# Patient Record
Sex: FEMALE | Race: WHITE | Employment: OTHER | URBAN - METROPOLITAN AREA
[De-identification: names, ages, dates, MRNs, and addresses within clinical notes are randomized per-mention and may not be internally consistent; named-entity substitution may affect disease eponyms.]

---

## 2017-01-11 ENCOUNTER — OFFICE VISIT (OUTPATIENT)
Dept: FAMILY MEDICINE | Facility: CLINIC | Age: 82
End: 2017-01-11
Payer: COMMERCIAL

## 2017-01-11 VITALS
HEART RATE: 71 BPM | SYSTOLIC BLOOD PRESSURE: 124 MMHG | OXYGEN SATURATION: 96 % | DIASTOLIC BLOOD PRESSURE: 60 MMHG | RESPIRATION RATE: 12 BRPM

## 2017-01-11 DIAGNOSIS — I35.0 SEVERE AORTIC STENOSIS: ICD-10-CM

## 2017-01-11 DIAGNOSIS — Z71.89 ADVANCED DIRECTIVES, COUNSELING/DISCUSSION: Chronic | ICD-10-CM

## 2017-01-11 DIAGNOSIS — M25.562 CHRONIC PAIN OF LEFT KNEE: ICD-10-CM

## 2017-01-11 DIAGNOSIS — G89.29 OTHER CHRONIC PAIN: ICD-10-CM

## 2017-01-11 DIAGNOSIS — G89.29 CHRONIC PAIN OF LEFT KNEE: ICD-10-CM

## 2017-01-11 DIAGNOSIS — M79.605 PAIN OF LEFT LOWER EXTREMITY: ICD-10-CM

## 2017-01-11 DIAGNOSIS — F02.818 DEMENTIA DUE TO MEDICAL CONDITION WITH BEHAVIORAL DISTURBANCE (H): Primary | ICD-10-CM

## 2017-01-11 PROCEDURE — 99350 HOME/RES VST EST HIGH MDM 60: CPT | Performed by: NURSE PRACTITIONER

## 2017-01-11 RX ORDER — TRAMADOL HYDROCHLORIDE 50 MG/1
50 TABLET ORAL EVERY 6 HOURS PRN
Qty: 10 TABLET | Refills: 0 | Status: SHIPPED | OUTPATIENT
Start: 2017-01-11

## 2017-01-11 NOTE — PROGRESS NOTES
SUBJECTIVE:                                                    Felicia Soriano is a 89 year old female who with a complex medical history significant for CKD stage 3, advanced dementia, Congestive heart failure, severe aortic stenosis, chronic knee pain is being followed by the complex care program and is seen in the home today for the following health issues:     All communication held between PCP and ALYSSA Carlson.     Concern - dementia     Onset: 2 years ago moderate to severe    Description:   Story telling, memory issues, wondering outside in the winter, some periods of incontinence urinary/bowel.     Intensity: moderate    Progression of Symptoms:  Stable.     Accompanying Signs & Symptoms:  Per group home RN patient's been having more behavioral issues,refusing care at times and physically aggressive. Recently increased seroquel 25 mg HS and improved agitation.     Previous history of similar problem: yes    Precipitating factors:   Worsened by: nothing    Alleviating factors:  Improved by: Seroquel and routine       Therapies Tried and outcome:seroquel       Joint Pain Left knee pain f/u     Onset: years     Description: She had a fall last March 2016 and since this time her left knee hurts.   Location: left knee  Character: Dull ache    Intensity: moderate    Progression of Symptoms: same    Accompanying Signs & Symptoms:  Other symptoms: none   History:   Previous similar pain: YES      Precipitating factors:   Trauma or overuse: YES- as stated she had a fall.     Alleviating factors:  Improved by: ice       Therapies Tried and outcome: had joint injection last week and effective.     Chronic Kidney Disease Follow-up      Current NSAID use?  No    Aortic Stenosis  History limited d/t patient's advanced dementia and sleeping today. Per PCA patient has no complaints. No syncopal episodes or dizziness since last hospital admission in 11/2016. Patient is very fatigued, low energy, sleeping almost 18 hrs/day. Gets  out of bed only to sit in recliner all day.     Problem list and histories reviewed & adjusted, as indicated.  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     Malignant neoplasm of breast (female) (H)     Vitamin D deficiency     Essential hypertension     Renal sclerosis     Edema     Advance Care Planning     CKD (chronic kidney disease) stage 3, GFR 30-59 ml/min     Heart failure with preserved ejection fraction (H)     Depression     Weakness     Excessive sleepiness     Flat affect     Infectious encephalopathy     Benign hypertensive heart and CKD, 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    Social History   Substance Use Topics     Smoking status: Never Smoker      Smokeless tobacco: Never Used     Alcohol Use: No     Family History   Problem Relation Age of Onset     Parkinsonism Father      Alzheimer Disease Mother          Current Outpatient Prescriptions   Medication Sig Dispense Refill     QUEtiapine (SEROQUEL) 25 MG tablet Take 1 tablet (25 mg) by mouth At Bedtime New increase 60 tablet 0     Vitamin D, Cholecalciferol, 1000 UNITS TABS Take 2,000 Units by mouth daily 180 tablet 3     aspirin 81 MG EC tablet Take 1 tablet (81 mg) by mouth daily 30 tablet 11     acetaminophen (TYLENOL) 325 MG tablet Take 2 tablets (650 mg) by mouth every 6 hours as needed for mild pain 100 tablet 3     traMADol (ULTRAM) 50 MG tablet Take 1 tablet (50 mg) by mouth every 6 hours as needed for moderate pain 10 tablet 0     docusate sodium (COLACE) 100 MG capsule Take 1 capsule (100 mg) by mouth daily 60 capsule 3     furosemide (LASIX) 20 MG  tablet Take 1 tablet (20 mg) by mouth daily 90 tablet 1     potassium chloride (K-TAB,KLOR-CON) 10 MEQ tablet Take 1 tablet (10 mEq) by mouth daily 90 tablet 1     order for DME Equipment being ordered: chair lift   For severe arthritis of left knee 1 Device 0     order for DME Equipment being ordered: chair lift 1 Device 0     phenazopyridine (PYRIDIUM) 100 MG tablet Take 1 tablet (100 mg) by mouth 3 times daily as needed for urinary tract discomfort 12 tablet 0     order for DME Equipment being ordered: Fully Electronic Hospital Bed 1 Device 0     melatonin 1 MG TABS Take 2 mg by mouth nightly as needed for sleep       senna-docusate (SENOKOT-S;PERICOLACE) 8.6-50 MG per tablet Take 1 tablet by mouth 2 times daily as needed for constipation       polyethylene glycol (MIRALAX/GLYCOLAX) powder Take 17 g by mouth daily as needed for constipation       order for DME Equipment being ordered: blood pressure kit , cuff size large 1 Device 0     albuterol (PROAIR HFA, PROVENTIL HFA, VENTOLIN HFA) 108 (90 BASE) MCG/ACT inhaler Inhale 2 puffs into the lungs every 6 hours as needed for shortness of breath / dyspnea or wheezing 1 Inhaler 0     Allergies   Allergen Reactions     Tape [Adhesive Tape] Rash     Paper tape     Recent Labs   Lab Test  12/30/16   1414  11/13/16   1900  03/24/16 03/13/16   1545   09/28/15   0904   A1C   --    --    --   5.4   --    --    --    --    ALT  13  14   --    --    --   18   --    --    CR  1.04  0.96   < >  0.84   < >  0.93   < >  0.94   GFRESTIMATED  50*  54*   < >  >60   < >  57*   < >  56*   GFRESTBLACK  60*  66   < >  >60   < >  69   < >  68   POTASSIUM  4.2  3.7   < >  4.1   < >  3.9   < >  3.7   TSH   --    --    --   4.05   --    --    --   2.09    < > = values in this interval not displayed.      BP Readings from Last 3 Encounters:   12/27/16 105/85   11/18/16 135/80   11/14/16 129/53    Wt Readings from Last 3 Encounters:   03/30/16 223 lb 6.4 oz (101.334 kg)   03/28/16 218 lb  9.6 oz (99.156 kg)   03/23/16 223 lb (101.152 kg)            Labs reviewed in EPIC  Problem list, Medication list, Allergies, and Medical/Social/Surgical histories reviewed in [facility] and updated as appropriate.    ROS:  Constitutional, HEENT, cardiovascular, pulmonary, GI, , musculoskeletal, neuro, skin, endocrine and psych systems are negative, except as otherwise noted.    OBJECTIVE:                                                    /60 mmHg  Pulse 71  Resp 12  SpO2 96%  There is no weight on file to calculate BMI.  GENERAL: sleeping throughout encounter in recliner chair   EYES: Eyes grossly normal to inspection, PERRL and conjunctivae and sclerae normal  RESP: lungs clear to auscultation - no rales, rhonchi or wheezes.   CV: Normal rate and rhythm. Systolic murmur 2/6 heard best over right sternal border.   ABDOMEN: soft, nontender, no hepatosplenomegaly, no masses and bowel sounds normal  MS: no gross musculoskeletal defects noted, no edema  SKIN: dry and warm  NEURO: sleeping during encounter. Not assessed.     Diagnostic Test Results:  none      ASSESSMENT/PLAN:                                                      1. 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  Sleeping throughout day 18hrs daily. Appetite has decreased, weight is down 10% from 1 year ago 233 lbs down to 199 lbs. Seroquel has been effective in managing episodic outburst of agitation and irritability. For the above reasons I believe this patient would benefit from hospice services. Referral completed.     2. Severe aortic stenosis  Fatigued and low energy. Stays in recliner most of the day.      3. Advance Care Planning  Dicussed hospice referral with  RN Robyn and patient's daughter aware and in agreement.     4. Chronic pain of left knee  Good relief with joint injection last week. Has Tramadol PRN ordered but was not sent from pharmacy per .          AHSAN [facility]  60 min spent in direct face to face time with this patient and group home RN and RNreater than 50% in counseling  \AS, dementia as stated above and coordination of care as stated above .

## 2017-01-11 NOTE — MR AVS SNAPSHOT
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  Home Care or Hospice will then contact the patient to schedule their appointment.**    If you do not hear from Home Care and Hospice, or you would like to call to schedule, please call the referring place of service that your provider has listed below.  ______________________________________________________________________    Your provider has referred you to: [facility]: [facility] [phone]   [email or web address]    Extended Emergency Contact Information  Primary Emergency Contact: CIRILO MONROE  Address: [address] [place] [address] [facility]  Home Phone: [phone]  Work Phone: none  Mobile Phone: [phone]  Relation: Daughter    Patient Anticipated Discharge Date: 01/11/2017     RN, PT, HHA to begin 24 - 48 hours after discharge.  PLEASE EVALUATE AND TREAT (Evaluation timeline is 24 - 48 hrs. Please call if there is need for a variance to this timeline).    REASON FOR REFERRAL: Hospice - Diagnosis: Advanced dementia and severe aortic stenosis    ADDITIONAL SERVICES NEEDED: none    OTHER PERTINENT INFORMATION: Patient was last seen  by provider on 01/11/2017   for hospice referral, knee pain, dementia with behavioral disturbances.    Current Outpatient Prescriptions:  QUEtiapine (SEROQUEL) 25 MG tablet, Take 1 tablet (25 mg) by mouth At Bedtime New increase, Disp: 60 tablet, Rfl: 0  Vitamin D, Cholecalciferol, 1000 UNITS TABS, Take 2,000 Units by mouth daily, Disp: 180 tablet, Rfl: 3  aspirin 81 MG EC tablet, Take 1 tablet (81 mg) by mouth daily, Disp: 30 tablet, Rfl: 11  acetaminophen (TYLENOL) 325 MG tablet, Take 2 tablets (650 mg) by mouth every 6 hours as needed for mild pain, Disp: 100 tablet, Rfl: 3  traMADol (ULTRAM) 50 MG tablet, Take 1 tablet (50 mg) by mouth every 6 hours as needed for moderate pain, Disp: 10 tablet, Rfl: 0  docusate sodium (COLACE) 100 MG capsule, Take 1 capsule (100 mg) by mouth daily, Disp: 60 capsule, Rfl: 3  furosemide (LASIX) 20 MG tablet, Take 1 tablet (20 mg) by mouth daily, Disp: 90 tablet, Rfl: 1  potassium chloride (K-TAB,KLOR-CON) 10 MEQ tablet, Take 1 tablet (10 mEq) by mouth daily, Disp: 90 tablet, Rfl: 1  order for DME, Equipment being ordered: chair lift   For severe arthritis of left knee, Disp: 1 Device, Rfl: 0  order for DME, Equipment being ordered: chair lift, Disp: 1 Device, Rfl: 0  phenazopyridine (PYRIDIUM) 100 MG tablet, Take 1 tablet (100 mg) by mouth 3 times daily as needed for urinary tract discomfort, Disp: 12 tablet, Rfl: 0  order for DME, Equipment being ordered: Fully Electronic Hospital Bed, Disp: 1 Device, Rfl: 0  melatonin 1 MG TABS, Take 2 mg by mouth nightly as needed for sleep, Disp: , Rfl:   senna-docusate (SENOKOT-S;PERICOLACE) 8.6-50 MG per tablet, Take 1 tablet by mouth 2 times daily as needed for constipation, Disp: , Rfl:   polyethylene glycol (MIRALAX/GLYCOLAX) powder, Take 17 g by mouth daily as needed for constipation, Disp: , Rfl:   order for DME, Equipment being ordered: blood pressure kit , cuff size large, Disp: 1 Device, Rfl: 0  albuterol (PROAIR HFA, PROVENTIL HFA,  VENTOLIN HFA) 108 (90 BASE) MCG/ACT inhaler, Inhale 2 puffs into the lungs every 6 hours as needed for shortness of breath / dyspnea or wheezing, Disp: 1 Inhaler, Rfl: 0      Patient Active Problem List:     Falls frequently     Aortic valve disorder     Esophageal reflux     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     Malignant neoplasm of breast (female) (H)     Vitamin D deficiency     Essential hypertension     Renal sclerosis     Edema     Advance Care Planning     CKD (chronic kidney disease) stage 3, GFR 30-59 ml/min     Heart failure with preserved ejection fraction (H)     Depression     Weakness     Excessive sleepiness     Flat affect     Infectious encephalopathy     Benign hypertensive heart and CKD, stage 3 (GFR 30-59), w CHF (H)     Physical deconditioning     Bilateral lower extremity pain     Agitation     Chronic pain of left knee     Health Care Home     Arthritis of knee     Syncope     Severe aortic stenosis      Documentation of Face to Face and Certification for Home Health Services    I certify that patientFelicia is under my care and that I, or a Nurse Practitioner or Physician's Assistant working with me, had a face-to-face encounter that meets the physician face-to-face encounter requirements with this patient on: 1/11/2017.    This encounter with the patient was in whole, or in part, for the following medical condition, which is the primary reason for Home Health Care: advanced dementia with behavioral disturbances, aortic stenosis.    I certify that, based on my findings, the following services are medically necessary Home Health Services: Nursing    My clinical findings support the need for the above services because: Nurse is needed: To assess pain and behavior after changes in medications or other medical regimen..    Further, I certify that my clinical findings support that this patient is homebound (i.e. absences from home require considerable and  taxing effort and are for medical reasons or [employer] services or infrequently or of short duration when for other reasons) because: Requires assistance of another person or specialized equipment to access medical services because patient: Is prone to wander/get lost without assistance..    Based on the above findings, I certify that this patient is confined to the home and needs intermittent skilled nursing care, physical therapy and/or speech therapy.  The patient is under my care, and I have initiated the establishment of the plan of care.  This patient will be followed by a physician who will periodically review the plan of care.    Physician/Provider to provide follow up care: Shahrzad Roach    [facility] certified Physician at time of discharge: Shahrzad Roach      Please be aware that coverage of these services is subject to the terms and limitations of your health insurance plan.  Call member services at your health plan with any benefit or coverage questions.                  Who to contact     If you have questions or need follow up information about today's clinic visit or your schedule please contact [facility] directly at [phone].  Normal or non-critical lab and imaging results will be communicated to you by PillGuardhart, letter or phone within 4 business days after the clinic has received the results. If you do not hear from us within 7 days, please contact the clinic through PillGuardhart or phone. If you have a critical or abnormal lab result, we will notify you by phone as soon as possible.  Submit refill requests through [employer] or call your pharmacy and they will forward the refill request to us. Please allow 3 business days for your refill to be completed.          Additional Information About Your Visit        PillGuardhart Information     W[employer] gives you secure access to your electronic health record. If you see a primary care provider, you can also send messages  to your care team and make appointments. If you have questions, please call your primary care clinic.  If you do not have a primary care provider, please call [phone] and they will assist you.        Care EveryWhere ID     This is your Care EveryWhere ID. This could be used by other organizations to access your [place] medical records  [id number]        Your Vitals Were     Pulse Respirations Pulse Oximetry             71 12 96%          Blood Pressure from Last 3 Encounters:   01/11/17 124/60   12/27/16 105/85   11/18/16 135/80    Weight from Last 3 Encounters:   03/30/16 223 lb 6.4 oz (101.334 kg)   03/28/16 218 lb 9.6 oz (99.156 kg)   03/23/16 223 lb (101.152 kg)              We Performed the Following     HOSPICE REFERRAL          Where to get your medicines      Some of these will need a paper prescription and others can be bought over the counter.  Ask your nurse if you have questions.     Bring a paper prescription for each of these medications    - traMADol 50 MG tablet       Primary Care Provider Office Phone # Fax #    AHSAN Gerard [phone]       [facility] [address] [place] [id number]        Thank you!     Thank you for choosing [facility]  for your care. Our goal is always to provide you with excellent care. Hearing back from our patients is one way we can continue to improve our services. Please take a few minutes to complete the written survey that you may receive in the mail after your visit with us. Thank you!             Your Updated Medication List - Protect others around you: Learn how to safely use, store and throw away your medicines at www.disposemymeds.org.          This list is accurate as of: 1/11/17 11:59 PM.  Always use your most recent med list.                   Brand Name Dispense Instructions for use    acetaminophen 325 MG tablet    TYLENOL    100 tablet    Take 2 tablets (650 mg) by mouth  every 6 hours as needed for mild pain       albuterol 108 (90 BASE) MCG/ACT Inhaler    PROAIR HFA/PROVENTIL HFA/VENTOLIN HFA    1 Inhaler    Inhale 2 puffs into the lungs every 6 hours as needed for shortness of breath / dyspnea or wheezing       aspirin 81 MG EC tablet     30 tablet    Take 1 tablet (81 mg) by mouth daily       docusate sodium 100 MG capsule    COLACE    60 capsule    Take 1 capsule (100 mg) by mouth daily       furosemide 20 MG tablet    LASIX    90 tablet    Take 1 tablet (20 mg) by mouth daily       melatonin 1 MG Tabs tablet      Take 2 mg by mouth nightly as needed for sleep       * order for DME     1 Device    Equipment being ordered: blood pressure kit , cuff size large       * order for DME     1 Device    Equipment being ordered: Fully Electronic Hospital Bed       * order for DME     1 Device    Equipment being ordered: chair lift       * order for DME     1 Device    Equipment being ordered: chair lift  For severe arthritis of left knee       phenazopyridine 100 MG tablet    PYRIDIUM    12 tablet    Take 1 tablet (100 mg) by mouth 3 times daily as needed for urinary tract discomfort       polyethylene glycol powder    MIRALAX/GLYCOLAX     Take 17 g by mouth daily as needed for constipation       potassium chloride 10 MEQ tablet    K-TAB,KLOR-CON    90 tablet    Take 1 tablet (10 mEq) by mouth daily       QUEtiapine 25 MG tablet    SEROQUEL    60 tablet    Take 1 tablet (25 mg) by mouth At Bedtime New increase       senna-docusate 8.6-50 MG per tablet    SENOKOT-S;PERICOLACE     Take 1 tablet by mouth 2 times daily as needed for constipation       traMADol 50 MG tablet    ULTRAM    10 tablet    Take 1 tablet (50 mg) by mouth every 6 hours as needed for moderate pain       Vitamin D (Cholecalciferol) 1000 UNITS Tabs     180 tablet    Take 2,000 Units by mouth daily       * Notice:  This list has 4 medication(s) that are the same as other medications prescribed for you. Read the directions  carefully, and ask your doctor or other care provider to review them with you.

## 2017-01-13 ENCOUNTER — TELEPHONE (OUTPATIENT)
Dept: FAMILY MEDICINE | Facility: CLINIC | Age: 82
End: 2017-01-13

## 2017-01-13 ENCOUNTER — MEDICAL CORRESPONDENCE (OUTPATIENT)
Dept: HEALTH INFORMATION MANAGEMENT | Facility: CLINIC | Age: 82
End: 2017-01-13

## 2017-01-13 NOTE — TELEPHONE ENCOUNTER
Spoke with ALYSSA Wright [facility] and gave verbal ok on orders requested per RN protocol.    Stephy Min RN

## 2017-01-13 NOTE — TELEPHONE ENCOUNTER
Adriana from  Hospice called and wanted to let us know that they had accepted her and was needing verbal order to admit with hospice orders.     Adriana can be reached at [phone]

## 2017-01-31 ENCOUNTER — MEDICAL CORRESPONDENCE (OUTPATIENT)
Dept: HEALTH INFORMATION MANAGEMENT | Facility: CLINIC | Age: 82
End: 2017-01-31

## 2017-02-14 ENCOUNTER — MEDICAL CORRESPONDENCE (OUTPATIENT)
Dept: HEALTH INFORMATION MANAGEMENT | Facility: CLINIC | Age: 82
End: 2017-02-14

## 2017-03-08 ENCOUNTER — OFFICE VISIT (OUTPATIENT)
Dept: FAMILY MEDICINE | Facility: CLINIC | Age: 82
End: 2017-03-08
Payer: MEDICARE

## 2017-03-08 DIAGNOSIS — F02.818 DEMENTIA DUE TO MEDICAL CONDITION WITH BEHAVIORAL DISTURBANCE (H): Primary | ICD-10-CM

## 2017-03-08 DIAGNOSIS — Z71.89 ADVANCED DIRECTIVES, COUNSELING/DISCUSSION: Chronic | ICD-10-CM

## 2017-03-08 DIAGNOSIS — I35.9 AORTIC VALVE DISORDER: ICD-10-CM

## 2017-03-08 PROCEDURE — 99350 HOME/RES VST EST HIGH MDM 60: CPT | Mod: GW | Performed by: NURSE PRACTITIONER

## 2017-03-08 RX ORDER — QUETIAPINE FUMARATE 25 MG/1
12.5 TABLET, FILM COATED ORAL AT BEDTIME
Qty: 60 TABLET | Refills: 0 | Status: SHIPPED | OUTPATIENT
Start: 2017-03-08 | End: 2017-04-12

## 2017-03-08 NOTE — PROGRESS NOTES
SUBJECTIVE:                                                    Felicia Soriano is a 89 year old female who with a complex medical history significant for CKD stage 3, advanced dementia, Congestive heart failure, severe aortic stenosis, chronic knee pain is being followed by the complex care program and is seen in the home today for the following health issues:     All communication held between PCP and PCA at . Patient is hospice enrolled.   Daughter Olive at bedside.     Concern - dementia Advanced      Onset: 2 years ago moderate to severe    Description:   Story telling, memory issues, wondering outside in the winter, some periods of incontinence urinary/bowel.     Intensity: moderate    Progression of Symptoms:  Stable.     Accompanying Signs & Symptoms:  delirium and agitation      Previous history of similar problem: yes    Precipitating factors:   Worsened by: nothing    Alleviating factors:  Improved by: Seroquel and routine       Therapies Tried and outcome:seroquel       Joint Pain Left knee pain f/u     Onset: years     Description:   Location: left knee  Character: Dull ache constant     Intensity: moderate    Progression of Symptoms: same    Accompanying Signs & Symptoms:  Other symptoms: none   History:   Previous similar pain: She had a fall last March 2016 and since this time her left knee hurts.     Precipitating factors:   Trauma or overuse: YES- as stated she had a fall.     Alleviating factors:  Improved by: ice, injections        Therapies Tried and outcome: had joint injection last week and effective.     Chronic Kidney Disease Follow-up      Current NSAID use?  No    Aortic Stenosis  History limited as patient is limited to a few words. No syncopal episodes or dizziness since last hospital admission in 11/2016 per . Patient is very fatigued, low energy, sleeping almost 18 hrs/day. Gets out of bed only to sit in recliner all day.      Problem list and histories reviewed & adjusted, as  indicated.  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     Malignant neoplasm of breast (female) (H)     Vitamin D deficiency     Essential hypertension     Renal sclerosis     Edema     Advance Care Planning     CKD (chronic kidney disease) stage 3, GFR 30-59 ml/min     Heart failure with preserved ejection fraction (H)     Depression     Weakness     Excessive sleepiness     Flat affect     Infectious encephalopathy     Benign hypertensive heart and CKD, 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    Social History   Substance Use Topics     Smoking status: Never Smoker     Smokeless tobacco: Never Used     Alcohol use No     Family History   Problem Relation Age of Onset     Parkinsonism Father      Alzheimer Disease Mother          Current Outpatient Prescriptions   Medication Sig Dispense Refill     traMADol (ULTRAM) 50 MG tablet Take 1 tablet (50 mg) by mouth every 6 hours as needed for moderate pain 10 tablet 0     QUEtiapine (SEROQUEL) 25 MG tablet Take 1 tablet (25 mg) by mouth At Bedtime New increase 60 tablet 0     Vitamin D, Cholecalciferol, 1000 UNITS TABS Take 2,000 Units by mouth daily 180 tablet 3     aspirin 81 MG EC tablet Take 1 tablet (81 mg) by mouth daily 30 tablet 11     acetaminophen (TYLENOL) 325 MG tablet Take 2 tablets (650 mg) by mouth every 6 hours as needed for mild pain 100 tablet 3     docusate sodium (COLACE) 100 MG capsule Take 1 capsule (100 mg) by mouth daily 60 capsule 3     furosemide (LASIX) 20 MG tablet Take 1 tablet (20 mg) by mouth daily 90 tablet 1     potassium chloride (K-TAB,KLOR-CON) 10 MEQ  tablet Take 1 tablet (10 mEq) by mouth daily 90 tablet 1     order for DME Equipment being ordered: chair lift   For severe arthritis of left knee 1 Device 0     order for DME Equipment being ordered: chair lift 1 Device 0     phenazopyridine (PYRIDIUM) 100 MG tablet Take 1 tablet (100 mg) by mouth 3 times daily as needed for urinary tract discomfort 12 tablet 0     order for DME Equipment being ordered: Fully Electronic Hospital Bed 1 Device 0     melatonin 1 MG TABS Take 2 mg by mouth nightly as needed for sleep       senna-docusate (SENOKOT-S;PERICOLACE) 8.6-50 MG per tablet Take 1 tablet by mouth 2 times daily as needed for constipation       polyethylene glycol (MIRALAX/GLYCOLAX) powder Take 17 g by mouth daily as needed for constipation       order for DME Equipment being ordered: blood pressure kit , cuff size large 1 Device 0     albuterol (PROAIR HFA, PROVENTIL HFA, VENTOLIN HFA) 108 (90 BASE) MCG/ACT inhaler Inhale 2 puffs into the lungs every 6 hours as needed for shortness of breath / dyspnea or wheezing 1 Inhaler 0     Allergies   Allergen Reactions     Tape [Adhesive Tape] Rash     Paper tape     Recent Labs   Lab Test  12/30/16   1414  11/13/16   1900  03/24/16 03/13/16   1545   09/28/15   0904   A1C   --    --    --   5.4   --    --    --    --    ALT  13  14   --    --    --   18   --    --    CR  1.04  0.96   < >  0.84   < >  0.93   < >  0.94   GFRESTIMATED  50*  54*   < >  >60   < >  57*   < >  56*   GFRESTBLACK  60*  66   < >  >60   < >  69   < >  68   POTASSIUM  4.2  3.7   < >  4.1   < >  3.9   < >  3.7   TSH   --    --    --   4.05   --    --    --   2.09    < > = values in this interval not displayed.      BP Readings from Last 3 Encounters:   01/11/17 124/60   12/27/16 105/85   11/18/16 135/80    Wt Readings from Last 3 Encounters:   03/30/16 223 lb 6.4 oz (101.3 kg)   03/28/16 218 lb 9.6 oz (99.2 kg)   03/23/16 223 lb (101.2 kg)            Labs reviewed in EPIC  Problem list, Medication  list, Allergies, and Medical/Social/Surgical histories reviewed in [facility] and updated as appropriate.    ROS:  Constitutional, HEENT, cardiovascular, pulmonary, GI, , musculoskeletal, neuro, skin, endocrine and psych systems are negative, except as otherwise noted.    OBJECTIVE:                                                    There were no vitals taken for this visit.  There is no height or weight on file to calculate BMI.  GENERAL: sleeping throughout encounter in recliner chair   EYES: Eyes grossly normal to inspection, PERRL and conjunctivae and sclerae normal  RESP: lungs clear to auscultation - no rales, rhonchi or wheezes.   CV: Normal rate and rhythm. Systolic murmur 2/6 heard best over right sternal border.   ABDOMEN: soft, nontender, no hepatosplenomegaly, no masses and bowel sounds normal  MS: no gross musculoskeletal defects noted, no edema  SKIN: dry and warm  NEURO: sleeping during encounter. Not assessed.     Diagnostic Test Results:  none      ASSESSMENT/PLAN:                                                      1. 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 Patient tried and failed trazodone. Is delusional and agitated off medication. Will try to decrease dose to see if symptoms are controlled.   - QUEtiapine (SEROQUEL) 25 MG tablet; Take 0.5 tablets (12.5 mg) by mouth At Bedtime New increase  Dispense: 60 tablet; Refill: 0    2. Aortic valve disorder  Severe. Fatigued. Spends most of the day in the chair. No syncopal episodes. No disease targeted treatment     3. Advance Care Planning  On hospice         [facility]  50min spent in direct face to face time with this patient daughter and group home PCA greater than 50% in counseling  AS, dementia as stated above and coordination of care as stated above .

## 2017-03-08 NOTE — MR AVS SNAPSHOT
After Visit Summary   3/8/2017    Felicia Soriano    MRN: [id number]           Patient Information     Date Of Birth          9/23/1926        Visit Information        Provider Department      3/8/2017 11:00 AM Shahrzad Roach APRN CNP 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 We will decrease this to 12.5 mg HS and see if your agitation and delusions are controlled with a lower dose.             Follow-ups after your visit        Your next 10 appointments already scheduled     May 10, 2017 11:00 AM CDT   [facility] with AHSAN Gerard C[facility]   [facility]    [address]  [address]  [facility] [address] [phone]            Jul 12, 2017 11:00 AM CDT   Return Visit with AHSAN Gerard CNP   [facility]    [address]  [address]  [facility] [address] [phone]              Who to contact     If you have questions or need follow up information about today's clinic visit or your schedule please contact [facility] directly at [phone].  Normal or non-critical lab and imaging results will be communicated to you by MyChart, letter or phone within 4 business days after the clinic has received the results. If you do not hear from us within 7 days, please contact the clinic through MyChart or phone. If you have a critical or abnormal lab result, we will notify you by phone as soon as possible.  Submit refill requests through [employer] or call your pharmacy and they will forward the refill request to us. Please allow 3 business days for your refill to be completed.          Additional Information About Your  Visit        Shout TVhart Information     [employer] gives you secure access to your electronic health record. If you see a primary care provider, you can also send messages to your care team and make appointments. If you have questions, please call your primary care clinic.  If you do not have a primary care provider, please call [phone] and they will assist you.        Care EveryWhere ID     This is your Care EveryWhere ID. This could be used by other organizations to access your [place] medical records  [id number]         Blood Pressure from Last 3 Encounters:   01/11/17 124/60   12/27/16 105/85   11/18/16 135/80    Weight from Last 3 Encounters:   03/30/16 223 lb 6.4 oz (101.3 kg)   03/28/16 218 lb 9.6 oz (99.2 kg)   03/23/16 223 lb (101.2 kg)              Today, you had the following     No orders found for display         Today's Medication Changes          These changes are accurate as of: 3/8/17 11:59 PM.  If you have any questions, ask your nurse or doctor.               These medicines have changed or have updated prescriptions.        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  [address], [facility] [address]     Phone:  [phone]     QUEtiapine 25 MG tablet                Primary Care Provider Office Phone # Fax #    AHSAN Gerard [facility] [phone]       [facility] [address] [facility] [address]  [facility] [address]        Thank you!     Thank you for choosing [facility]  for your care. Our goal is always to provide you with excellent care.  Hearing back from our patients is one way we can continue to improve our services. Please take a few minutes to complete the written survey that you may receive in the mail after your visit with us. Thank you!             Your Updated Medication List - Protect others around you: Learn how to safely use, store and throw away your medicines at www.disposemymeds.org.          This list is accurate as of: 3/8/17 11:59 PM.  Always use your most recent med list.                   Brand Name Dispense Instructions for use    acetaminophen 325 MG tablet    TYLENOL    100 tablet    Take 2 tablets (650 mg) by mouth every 6 hours as needed for mild pain       albuterol 108 (90 BASE) MCG/ACT Inhaler    PROAIR HFA/PROVENTIL HFA/VENTOLIN HFA    1 Inhaler    Inhale 2 puffs into the lungs every 6 hours as needed for shortness of breath / dyspnea or wheezing       aspirin 81 MG EC tablet     30 tablet    Take 1 tablet (81 mg) by mouth daily       docusate sodium 100 MG capsule    COLACE    60 capsule    Take 1 capsule (100 mg) by mouth daily       furosemide 20 MG tablet    LASIX    90 tablet    Take 1 tablet (20 mg) by mouth daily       melatonin 1 MG Tabs tablet      Take 2 mg by mouth nightly as needed for sleep       * order for DME     1 Device    Equipment being ordered: blood pressure kit , cuff size large       * order for DME     1 Device    Equipment being ordered: Fully Electronic Hospital Bed       * order for DME     1 Device    Equipment being ordered: chair lift       * order for DME     1 Device    Equipment being ordered: chair lift  For severe arthritis of left knee       phenazopyridine 100 MG tablet    PYRIDIUM    12 tablet    Take 1 tablet (100 mg) by mouth 3 times daily as needed for urinary tract discomfort       polyethylene glycol powder    MIRALAX/GLYCOLAX     Take 17 g by mouth daily as needed for constipation       potassium chloride 10 MEQ tablet    K-TAB,KLOR-CON    90 tablet    Take 1 tablet (10 mEq) by  mouth daily       QUEtiapine 25 MG tablet    SEROQUEL    60 tablet    Take 0.5 tablets (12.5 mg) by mouth At Bedtime New increase       senna-docusate 8.6-50 MG per tablet    SENOKOT-S;PERICOLACE     Take 1 tablet by mouth 2 times daily as needed for constipation       traMADol 50 MG tablet    ULTRAM    10 tablet    Take 1 tablet (50 mg) by mouth every 6 hours as needed for moderate pain       Vitamin D (Cholecalciferol) 1000 UNITS Tabs     180 tablet    Take 2,000 Units by mouth daily       * Notice:  This list has 4 medication(s) that are the same as other medications prescribed for you. Read the directions carefully, and ask your doctor or other care provider to review them with you.

## 2017-03-08 NOTE — PATIENT INSTRUCTIONS
Discussed risks vs benefits of seroquel with dementia. We will decrease this to 12.5 mg HS and see if your agitation and delusions are controlled with a lower dose.

## 2017-03-30 ENCOUNTER — TELEPHONE (OUTPATIENT)
Dept: FAMILY MEDICINE | Facility: CLINIC | Age: 82
End: 2017-03-30

## 2017-03-30 NOTE — TELEPHONE ENCOUNTER
Akil with [facility] is calling to request that seroquel be increased back to 25 mg or PRN does be added back.     nurse has been reporting more behaviors, including striking out at staff.    Please call Akil at [phone].    Seroquel was decreased at last visit (3/8/17)

## 2017-03-30 NOTE — TELEPHONE ENCOUNTER
Left voicemail for ALYSSA [facility] requesting return call.  Would like further information to include:    1.  VS with temp  2.  How is patient compared to his previous visit  3.  Is she showing any signs of possible UTI or other infection that might make patient's behavior change    Await return call.    Stephy Min RN

## 2017-03-31 NOTE — TELEPHONE ENCOUNTER
Sent e-mail with information requested to [facility].    Stephy Min RN

## 2017-04-03 NOTE — TELEPHONE ENCOUNTER
Informed YUMI Carlson RN, of Wandy's note below regarding Seroquel 25 mg. Robyn voiced understanding.    Shireen Pearson RN

## 2017-04-03 NOTE — TELEPHONE ENCOUNTER
Received message from Akil stating her deferred below information to the facility nurse.  He stated he did not have the information clinic was looking for.    Spoke with Robyn  nurse who states she thought Akil had received permission last Thursday to increase the seroquel.  She has been getting 25mg since this day.  Patient has had no temp and VSS.  Patient has bruises on the tops of her hands from hitting the railing while she was on the 12.5mg seroquel.  Robyn reports patient does show improvement on the 25mg but understands this is not listed as her current dose.  Patient is sleeping the same amount since dose increased. Robyn had spoken with daughter who states ok on the increased dose.    Routing to provider to see if ok to keep her on the 25 mg?      Stephy Min RN

## 2017-04-05 DIAGNOSIS — E87.6 HYPOKALEMIA: Primary | ICD-10-CM

## 2017-04-11 ENCOUNTER — MEDICAL CORRESPONDENCE (OUTPATIENT)
Dept: HEALTH INFORMATION MANAGEMENT | Facility: CLINIC | Age: 82
End: 2017-04-11

## 2017-04-11 RX ORDER — POTASSIUM CHLORIDE 750 MG/1
TABLET, EXTENDED RELEASE ORAL
Qty: 30 TABLET | Refills: 0 | Status: SHIPPED | OUTPATIENT
Start: 2017-04-11 | End: 2017-11-09

## 2017-04-12 ENCOUNTER — OFFICE VISIT (OUTPATIENT)
Dept: FAMILY MEDICINE | Facility: CLINIC | Age: 82
End: 2017-04-12
Payer: MEDICARE

## 2017-04-12 VITALS — SYSTOLIC BLOOD PRESSURE: 101 MMHG | HEART RATE: 68 BPM | DIASTOLIC BLOOD PRESSURE: 63 MMHG

## 2017-04-12 DIAGNOSIS — Z71.89 ADVANCED DIRECTIVES, COUNSELING/DISCUSSION: Chronic | ICD-10-CM

## 2017-04-12 DIAGNOSIS — F02.818 DEMENTIA DUE TO MEDICAL CONDITION WITH BEHAVIORAL DISTURBANCE (H): Primary | ICD-10-CM

## 2017-04-12 DIAGNOSIS — I35.0 SEVERE AORTIC STENOSIS: ICD-10-CM

## 2017-04-12 PROCEDURE — 99350 HOME/RES VST EST HIGH MDM 60: CPT | Mod: GW | Performed by: NURSE PRACTITIONER

## 2017-04-12 RX ORDER — QUETIAPINE FUMARATE 25 MG/1
25 TABLET, FILM COATED ORAL AT BEDTIME
Qty: 60 TABLET | Refills: 0 | Status: SHIPPED | OUTPATIENT
Start: 2017-04-12 | End: 2017-06-28

## 2017-04-12 NOTE — MR AVS SNAPSHOT
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         Follow-ups after your visit        Your next 10 appointments already scheduled     May 23, 2017 11:00 AM CDT   Return Visit with AHSAN Gerard CNP   [facility]    [address]  [address]  [facility] [address]   [phone]            Jun 14, 2017 11:00 AM CDT   Return Visit with AHSAN Gerard CNP   [facility]    [address]  [address]  [facility] [address]   [phone]            Jul 12, 2017 11:00 AM CDT   Return Visit with AHSAN Gerard CNP   [facility]    [address]  [address]  [facility] [address]   [phone]              Who to contact     If you have questions or need follow up information about today's clinic visit or your schedule please contact [facility] directly at [phone].  Normal or non-critical lab and imaging results will be communicated to you by MyChart, letter or phone within 4 business days after the clinic has received the results. If you do not hear from us within 7 days, please contact the clinic through MyChart or phone. If you have a critical or abnormal lab result, we will notify you by phone as soon as possible.  Submit refill requests through [employer] or call your pharmacy and they will forward the  refill request to us. Please allow 3 business days for your refill to be completed.          Additional Information About Your Visit        DocRunhart Information     [employer] gives you secure access to your electronic health record. If you see a primary care provider, you can also send messages to your care team and make appointments. If you have questions, please call your primary care clinic.  If you do not have a primary care provider, please call [phone] and they will assist you.        Care EveryWhere ID     This is your Care EveryWhere ID. This could be used by other organizations to access your [place] medical records  [id number]        Your Vitals Were     Pulse                   68            Blood Pressure from Last 3 Encounters:   04/12/17 101/63   01/11/17 124/60   12/27/16 105/85    Weight from Last 3 Encounters:   03/30/16 223 lb 6.4 oz (101.3 kg)   03/28/16 218 lb 9.6 oz (99.2 kg)   03/23/16 223 lb (101.2 kg)              Today, you had the following     No orders found for display         Today's Medication Changes          These changes are accurate as of: 4/12/17 11:59 PM.  If you have any questions, ask your nurse or doctor.               These medicines have changed or have updated prescriptions.        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, [facility] [address]     Phone:  [phone]     QUEtiapine 25 MG tablet                Primary Care Provider Office Phone # Fax #    AHSAN Gerard [phone]       [facility] [address] [facility] [address]  [facility] [address]        Thank you!     Thank you for choosing [facility]  for your care. Our goal is always to provide you with excellent care. Hearing back from our patients is one way we can continue to improve our services. Please take a few minutes to complete the written survey that you may receive in the mail after your visit with us. Thank you!             Your Updated Medication List - Protect others around you: Learn how to safely use, store and throw away your medicines at www.disposemymeds.org.          This list is accurate as of: 4/12/17 11:59 PM.  Always use your most recent med list.                   Brand Name Dispense Instructions for use    acetaminophen 325 MG tablet    TYLENOL    100 tablet    Take 2 tablets (650 mg) by mouth every 6 hours as needed for mild pain       albuterol 108 (90 BASE) MCG/ACT Inhaler    PROAIR HFA/PROVENTIL HFA/VENTOLIN HFA    1 Inhaler    Inhale 2 puffs into the lungs every 6 hours as needed for shortness of breath / dyspnea or wheezing       aspirin 81 MG EC tablet     30 tablet    Take 1 tablet (81 mg) by mouth daily       docusate sodium 100 MG capsule    COLACE    60 capsule    Take 1 capsule (100 mg) by mouth daily       furosemide 20 MG tablet    LASIX    90 tablet    TAKE 20MG (1 TABLET) BY MOUTH EVERY DAY       melatonin 1 MG Tabs tablet      Take 2 mg by mouth nightly as needed for sleep       * order for DME     1 Device    Equipment being ordered: blood pressure kit , cuff size large       * order for DME     1 Device    Equipment being ordered: Fully Electronic Hospital Bed       * order for DME     1 Device    Equipment being ordered: chair lift       * order for DME     1 Device    Equipment being ordered: chair lift  For severe arthritis of left knee       phenazopyridine 100 MG tablet    PYRIDIUM    12 tablet    Take 1 tablet (100 mg) by mouth 3 times daily as needed for urinary tract discomfort       polyethylene  glycol powder    MIRALAX/GLYCOLAX     Take 17 g by mouth daily as needed for constipation       potassium chloride 10 MEQ tablet    K-TAB,KLOR-CON    90 tablet    Take 1 tablet (10 mEq) by mouth daily       potassium chloride SA 10 MEQ CR tablet    K-DUR/KLOR-CON M    30 tablet    TAKE 10MEQ (1 TABLET) BY MOUTH EVERY DAY       QUEtiapine 25 MG tablet    SEROQUEL    60 tablet    Take 1 tablet (25 mg) by mouth At Bedtime New increase       senna-docusate 8.6-50 MG per tablet    SENOKOT-S;PERICOLACE     Take 1 tablet by mouth 2 times daily as needed for constipation       traMADol 50 MG tablet    ULTRAM    10 tablet    Take 1 tablet (50 mg) by mouth every 6 hours as needed for moderate pain       Vitamin D (Cholecalciferol) 1000 UNITS Tabs     180 tablet    Take 2,000 Units by mouth daily       * Notice:  This list has 4 medication(s) that are the same as other medications prescribed for you. Read the directions carefully, and ask your doctor or other care provider to review them with you.

## 2017-04-12 NOTE — PROGRESS NOTES
SUBJECTIVE:                                                    Felicia Soriano is a 89 year old female who with a complex medical history significant for CKD stage 3, advanced dementia, Congestive heart failure, severe aortic stenosis, chronic knee pain is being followed by the complex care program and is seen in the home today for the following health issues:     All communication held between PCP and  RN Robyn and Olive daughter. Patient is hospice enrolled.      Concern - dementia Advanced      Onset: 2 years ago moderate to severe    Description:   Story telling, memory issues, wondering outside in the winter, some periods of incontinence urinary/bowel.     Intensity: moderate    Progression of Symptoms:  Stable.     Accompanying Signs & Symptoms:  delirium and agitation      Previous history of similar problem: yes    Precipitating factors:   Worsened by: nothing    Alleviating factors:  Improved by: tried decreasing dose of Seroquel last visit to see results, patient had more behavioral outbursts and agitated. Was restarted on Seroquel.        Therapies Tried and outcome:seroquel       Joint Pain Left knee pain f/u     Onset: years     Description:   Location: left knee  Character: Dull ache constant     Intensity: moderate    Progression of Symptoms: same    Accompanying Signs & Symptoms:  Other symptoms: none   History:   Previous similar pain: She had a fall last March 2016 and since this time her left knee hurts.     Precipitating factors:   Trauma or overuse: YES- as stated she had a fall.     Alleviating factors:  Improved by: ice, injections        Therapies Tried and outcome: had joint injection last week and effective.     Chronic Kidney Disease Follow-up      Current NSAID use?  No    Aortic Stenosis  Sleeps for most of the day. Shortness of breath on exertion. No syncopal episodes.     Problem list and histories reviewed & adjusted, as indicated.  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     Malignant neoplasm of breast (female) (H)     Vitamin D deficiency     Essential hypertension     Renal sclerosis     Edema     Advance Care Planning     CKD (chronic kidney disease) stage 3, GFR 30-59 ml/min     Heart failure with preserved ejection fraction (H)     Depression     Weakness     Excessive sleepiness     Flat affect     Infectious encephalopathy     Benign hypertensive heart and CKD, 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    Social History   Substance Use Topics     Smoking status: Never Smoker     Smokeless tobacco: Never Used     Alcohol use No     Family History   Problem Relation Age of Onset     Parkinsonism Father      Alzheimer Disease Mother          Current Outpatient Prescriptions   Medication Sig Dispense Refill     potassium chloride SA (K-DUR/KLOR-CON M) 10 MEQ CR tablet TAKE 10MEQ (1 TABLET) BY MOUTH EVERY DAY 30 tablet 0     furosemide (LASIX) 20 MG tablet TAKE 20MG (1 TABLET) BY MOUTH EVERY DAY 90 tablet 3     QUEtiapine (SEROQUEL) 25 MG tablet Take 0.5 tablets (12.5 mg) by mouth At Bedtime New increase 60 tablet 0     traMADol (ULTRAM) 50 MG tablet Take 1 tablet (50 mg) by mouth every 6 hours as needed for moderate pain 10 tablet 0     Vitamin D, Cholecalciferol, 1000 UNITS TABS Take 2,000 Units by mouth daily 180 tablet 3     aspirin 81 MG EC tablet Take 1 tablet (81 mg) by mouth daily 30 tablet 11     acetaminophen (TYLENOL) 325 MG tablet Take 2 tablets (650 mg) by mouth every 6 hours as needed for mild pain 100 tablet 3     docusate sodium (COLACE) 100 MG capsule Take 1 capsule (100 mg) by mouth daily 60  capsule 3     potassium chloride (K-TAB,KLOR-CON) 10 MEQ tablet Take 1 tablet (10 mEq) by mouth daily 90 tablet 1     order for DME Equipment being ordered: chair lift   For severe arthritis of left knee 1 Device 0     order for DME Equipment being ordered: chair lift 1 Device 0     phenazopyridine (PYRIDIUM) 100 MG tablet Take 1 tablet (100 mg) by mouth 3 times daily as needed for urinary tract discomfort 12 tablet 0     order for DME Equipment being ordered: Fully Electronic Hospital Bed 1 Device 0     melatonin 1 MG TABS Take 2 mg by mouth nightly as needed for sleep       senna-docusate (SENOKOT-S;PERICOLACE) 8.6-50 MG per tablet Take 1 tablet by mouth 2 times daily as needed for constipation       polyethylene glycol (MIRALAX/GLYCOLAX) powder Take 17 g by mouth daily as needed for constipation       order for DME Equipment being ordered: blood pressure kit , cuff size large 1 Device 0     albuterol (PROAIR HFA, PROVENTIL HFA, VENTOLIN HFA) 108 (90 BASE) MCG/ACT inhaler Inhale 2 puffs into the lungs every 6 hours as needed for shortness of breath / dyspnea or wheezing 1 Inhaler 0     Allergies   Allergen Reactions     Tape [Adhesive Tape] Rash     Paper tape     Recent Labs   Lab Test  12/30/16   1414  11/13/16   1900  03/24/16 03/13/16   1545   09/28/15   0904   A1C   --    --    --   5.4   --    --    --    --    ALT  13  14   --    --    --   18   --    --    CR  1.04  0.96   < >  0.84   < >  0.93   < >  0.94   GFRESTIMATED  50*  54*   < >  >60   < >  57*   < >  56*   GFRESTBLACK  60*  66   < >  >60   < >  69   < >  68   POTASSIUM  4.2  3.7   < >  4.1   < >  3.9   < >  3.7   TSH   --    --    --   4.05   --    --    --   2.09    < > = values in this interval not displayed.      BP Readings from Last 3 Encounters:   01/11/17 124/60   12/27/16 105/85   11/18/16 135/80    Wt Readings from Last 3 Encounters:   03/30/16 223 lb 6.4 oz (101.3 kg)   03/28/16 218 lb 9.6 oz (99.2 kg)   03/23/16 223 lb (101.2 kg)             Labs reviewed in EPIC  Problem list, Medication list, Allergies, and Medical/Social/Surgical histories reviewed in [facility] and updated as appropriate.    ROS:  Constitutional, HEENT, cardiovascular, pulmonary, GI, , musculoskeletal, neuro, skin, endocrine and psych systems are negative, except as otherwise noted.    OBJECTIVE:                                                    /63  Pulse 68  There is no height or weight on file to calculate BMI.  GENERAL: patient is very fatigued and sleeping throughout encounter in recliner chair.   EYES: Dark circles around eyes. PERRL and conjunctivae and sclerae normal  RESP: lungs clear to auscultation - no rales, rhonchi or wheezes.   CV: Normal rate and rhythm. Systolic murmur 2/6 heard best over right sternal border.   ABDOMEN: soft, nontender, no hepatosplenomegaly, no masses and bowel sounds normal  MS: no gross musculoskeletal defects noted, no edema  SKIN: dry and warm      Diagnostic Test Results:  none      ASSESSMENT/PLAN:                                                      1. 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 Dose readjusted.     2. Severe aortic stenosis  Sleeps most of the day. SOB on exertion. No falls.     3. Advance Care Planning  Hospice enrolled.       [facility]  60min spent in direct face to face time with this patient daughter and group home PCA greater than 50% in counseling  AS, dementia as stated above and coordination of care as stated above .

## 2017-04-18 DIAGNOSIS — T50.2X5A DIURETIC-INDUCED HYPOKALEMIA: ICD-10-CM

## 2017-04-18 DIAGNOSIS — E87.6 DIURETIC-INDUCED HYPOKALEMIA: ICD-10-CM

## 2017-04-19 RX ORDER — POTASSIUM CHLORIDE 750 MG/1
TABLET, EXTENDED RELEASE ORAL
Qty: 30 TABLET | Refills: 0 | Status: SHIPPED | OUTPATIENT
Start: 2017-04-19 | End: 2017-06-28

## 2017-04-25 ENCOUNTER — CARE COORDINATION (OUTPATIENT)
Dept: GERIATRIC MEDICINE | Facility: CLINIC | Age: 82
End: 2017-04-25

## 2017-04-25 NOTE — PROGRESS NOTES
Left message for client's daughter Olive requesting return call to schedule client's annual home assessment visit at the group home.  SANTHOSH Oneal, [facility]   Tel [phone]  Fax [phone]

## 2017-04-27 ENCOUNTER — TELEPHONE (OUTPATIENT)
Dept: FAMILY MEDICINE | Facility: CLINIC | Age: 82
End: 2017-04-27

## 2017-04-27 NOTE — TELEPHONE ENCOUNTER
ALYSSA Garza CM with  Hospice called to inform PCP that they are changing Felicia's primary hospice dx from: Alzheimers to: Rheumatic aortic stenosis.  They believe this is more appropriate given increased lethargy, activity tolerance, sleeping and bilateral LE edema.    If there are any questions you can reach Greg at [phone].    Routing to PCP

## 2017-05-02 NOTE — PROGRESS NOTES
Left message again today for dtdeuce Schaefer to schedule annual home visit.  SANTHOSH Oneal, [facility]   Tel [phone]  Fax [phone]

## 2017-05-02 NOTE — PROGRESS NOTES
Spoke with RN/que Carlson at [facility].  Client's daughter sent her a text and would like to have the annual visit on May 22nd.  Scheduled for 11 am.  Robyn will let daughter know the time.  SANTHOSH Oneal, [facility]   Tel [phone]  Fax [phone]

## 2017-05-04 NOTE — PROGRESS NOTES
Received call from Brittney at [facility] stating that the AL provider has not cashed her GRH checks and one has now .  Call Robyn, group home owner, and gave her this information.  Gave her name and phone number of [facility] financial worker.  SANTHOSH Oneal, [facility]   Tel [phone]  Fax [phone]

## 2017-05-22 ENCOUNTER — CARE COORDINATION (OUTPATIENT)
Dept: GERIATRIC MEDICINE | Facility: CLINIC | Age: 82
End: 2017-05-22

## 2017-05-22 DIAGNOSIS — Z71.89 ADVANCED DIRECTIVES, COUNSELING/DISCUSSION: Chronic | ICD-10-CM

## 2017-05-23 ENCOUNTER — OFFICE VISIT (OUTPATIENT)
Dept: FAMILY MEDICINE | Facility: CLINIC | Age: 82
End: 2017-05-23
Payer: MEDICARE

## 2017-05-23 ENCOUNTER — MEDICAL CORRESPONDENCE (OUTPATIENT)
Dept: HEALTH INFORMATION MANAGEMENT | Facility: CLINIC | Age: 82
End: 2017-05-23

## 2017-05-23 DIAGNOSIS — G89.29 CHRONIC PAIN OF LEFT KNEE: ICD-10-CM

## 2017-05-23 DIAGNOSIS — M17.10 ARTHRITIS OF KNEE: ICD-10-CM

## 2017-05-23 DIAGNOSIS — M25.562 CHRONIC PAIN OF LEFT KNEE: ICD-10-CM

## 2017-05-23 DIAGNOSIS — Z71.89 ADVANCED DIRECTIVES, COUNSELING/DISCUSSION: Chronic | ICD-10-CM

## 2017-05-23 DIAGNOSIS — I35.0 SEVERE AORTIC STENOSIS: Primary | ICD-10-CM

## 2017-05-23 DIAGNOSIS — N18.30 CKD (CHRONIC KIDNEY DISEASE) STAGE 3, GFR 30-59 ML/MIN (H): ICD-10-CM

## 2017-05-23 PROCEDURE — 99350 HOME/RES VST EST HIGH MDM 60: CPT | Mod: GW | Performed by: NURSE PRACTITIONER

## 2017-05-23 NOTE — PROGRESS NOTES
Home visit/Facundo Risk Assessment/EW screening completed on: 5/22/17 at residential home with client, dtr, and Robyn, owner of home.  Client participated minimally-sometimes was aware but didn't answer questions and other times she was sleeping.  [facility]  Member currently receiving the following services: residential living services and incontinence products.   See EMR for a list of client's diagnoses and medications. Client is followed by [facility] for aortic valve disease.  Medication management: Medications reviewed. Care giver administers medication. MTM offered.  Falls: none  ADL's/IADL's: see LTCC      Member Mood/behavior-PHQ2 score:  Client unable to respond to questions.  [facility] sponsored benefits: Shared information re: Silver Sneakers/gym memberships, ASA, Calcium +D.  Plan of Care Is: Continue residential living services.  Continue incontinence products.  Called [facility] as client is not receiving wipes.  CM requested that auth be submitted to [facility] for the wipes to be covered by EW.  Brakes on client's walker do not work.  Dtr states she purchased walker several years ago.  Ordered new deluxe walker from [employer].  Caregiver support: none lives in residential home.  Follow-Up Plan: Member informed of future contact, plan to f/u with member with a 6 month telephone assessment.  Contact information shared with member and family, encouraged member to call with any questions or concerns prior to this.  See [facility] for further detailed information  SANTHOSH Oneal, [facility]   Tel [phone]  Fax [phone]

## 2017-05-23 NOTE — PROGRESS NOTES
SUBJECTIVE:                                                    Felicia Soriano is a 89 year old female who with a complex medical history significant for CKD stage 3, advanced dementia, Congestive heart failure, severe aortic stenosis, chronic knee pain is being followed by the complex care program and is seen in the home today for the following health issues:     All communication held between PCP and  RN Robyn and Olive daughter. Patient is hospice enrolled. Was just re certified with AS diagnosis instead of Dementia.       Concern - dementia Advanced      Onset: 2 years ago moderate to severe    Description:   Story telling, memory issues, wondering outside in the winter, some periods of incontinence urinary/bowel.     Intensity: moderate    Progression of Symptoms:  Stable.     Accompanying Signs & Symptoms:  delirium and agitation      Previous history of similar problem: yes    Precipitating factors:   Worsened by: nothing    Alleviating factors:  Improved by: Seroquel       Therapies Tried and outcome:seroquel     Ambulation  Requires new walker. The brakes on her current walker are broken.     Joint Pain Left knee pain f/u     Onset: years     Description:   Location: left knee  Character: Dull ache constant     Intensity: moderate    Progression of Symptoms: same    Accompanying Signs & Symptoms:  Other symptoms: none   History:   Previous similar pain: She had a fall last March 2016 and since this time her left knee hurts.     Precipitating factors:   Trauma or overuse: YES- as stated she had a fall.     Alleviating factors:  Improved by: ice, injections        Therapies Tried and outcome: had joint injection last week and effective.     Chronic Kidney Disease Follow-up      Current NSAID use?  No    Aortic Stenosis  She was started on oxygen 2 L at night. She is much more alert during the day. Shortness of breath on exertion. No syncopal episodes.     Problem list and histories reviewed & adjusted, as  indicated.  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     Malignant neoplasm of breast (female) (H)     Vitamin D deficiency     Essential hypertension     Renal sclerosis     Edema     Advance Care Planning     CKD (chronic kidney disease) stage 3, GFR 30-59 ml/min     Heart failure with preserved ejection fraction (H)     Depression     Weakness     Excessive sleepiness     Flat affect     Infectious encephalopathy     Benign hypertensive heart and CKD, 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    Social History   Substance Use Topics     Smoking status: Never Smoker     Smokeless tobacco: Never Used     Alcohol use No     Family History   Problem Relation Age of Onset     Parkinsonism Father      Alzheimer Disease Mother          Current Outpatient Prescriptions   Medication Sig Dispense Refill     potassium chloride (K-TAB,KLOR-CON) 10 MEQ tablet TAKE 10MEQ (1 TABLET) BY MOUTH EVERY DAY. 30 tablet 0     QUEtiapine (SEROQUEL) 25 MG tablet Take 1 tablet (25 mg) by mouth At Bedtime New increase 60 tablet 0     potassium chloride SA (K-DUR/KLOR-CON M) 10 MEQ CR tablet TAKE 10MEQ (1 TABLET) BY MOUTH EVERY DAY 30 tablet 0     furosemide (LASIX) 20 MG tablet TAKE 20MG (1 TABLET) BY MOUTH EVERY DAY 90 tablet 3     traMADol (ULTRAM) 50 MG tablet Take 1 tablet (50 mg) by mouth every 6 hours as needed for moderate pain 10 tablet 0     Vitamin D, Cholecalciferol, 1000 UNITS TABS Take 2,000 Units by mouth daily 180 tablet 3     aspirin 81 MG EC tablet Take 1 tablet (81 mg) by mouth daily 30 tablet 11     acetaminophen (TYLENOL) 325 MG tablet Take  2 tablets (650 mg) by mouth every 6 hours as needed for mild pain 100 tablet 3     docusate sodium (COLACE) 100 MG capsule Take 1 capsule (100 mg) by mouth daily 60 capsule 3     order for DME Equipment being ordered: chair lift   For severe arthritis of left knee 1 Device 0     order for DME Equipment being ordered: chair lift 1 Device 0     phenazopyridine (PYRIDIUM) 100 MG tablet Take 1 tablet (100 mg) by mouth 3 times daily as needed for urinary tract discomfort 12 tablet 0     order for DME Equipment being ordered: Fully Electronic Hospital Bed 1 Device 0     melatonin 1 MG TABS Take 2 mg by mouth nightly as needed for sleep       senna-docusate (SENOKOT-S;PERICOLACE) 8.6-50 MG per tablet Take 1 tablet by mouth 2 times daily as needed for constipation       polyethylene glycol (MIRALAX/GLYCOLAX) powder Take 17 g by mouth daily as needed for constipation       order for DME Equipment being ordered: blood pressure kit , cuff size large 1 Device 0     albuterol (PROAIR HFA, PROVENTIL HFA, VENTOLIN HFA) 108 (90 BASE) MCG/ACT inhaler Inhale 2 puffs into the lungs every 6 hours as needed for shortness of breath / dyspnea or wheezing 1 Inhaler 0     Allergies   Allergen Reactions     Tape [Adhesive Tape] Rash     Paper tape     Recent Labs   Lab Test  12/30/16   1414  11/13/16   1900  03/24/16 03/13/16   1545   09/28/15   0904   A1C   --    --    --   5.4   --    --    --    --    ALT  13  14   --    --    --   18   --    --    CR  1.04  0.96   < >  0.84   < >  0.93   < >  0.94   GFRESTIMATED  50*  54*   < >  >60   < >  57*   < >  56*   GFRESTBLACK  60*  66   < >  >60   < >  69   < >  68   POTASSIUM  4.2  3.7   < >  4.1   < >  3.9   < >  3.7   TSH   --    --    --   4.05   --    --    --   2.09    < > = values in this interval not displayed.      BP Readings from Last 3 Encounters:   04/12/17 101/63   01/11/17 124/60   12/27/16 105/85    Wt Readings from Last 3 Encounters:   03/30/16 223 lb 6.4 oz (101.3 kg)    03/28/16 218 lb 9.6 oz (99.2 kg)   03/23/16 223 lb (101.2 kg)            Labs reviewed in EPIC  Problem list, Medication list, Allergies, and Medical/Social/Surgical histories reviewed in [facility] and updated as appropriate.    ROS:  Constitutional, HEENT, cardiovascular, pulmonary, GI, , musculoskeletal, neuro, skin, endocrine and psych systems are negative, except as otherwise noted.    OBJECTIVE:                                                    /70 (BP Location: Right arm, Cuff Size: Adult Regular)  Pulse 82  Resp 14  Wt 196 lb (88.9 kg)  SpO2 93%  BMI 31.64 kg/m2  There is no height or weight on file to calculate BMI.  GENERAL: frail elderly, up in recliner chair and alert today.   EYES:  PERRL and conjunctivae and sclerae normal  RESP: lungs clear to auscultation - no rales, rhonchi or wheezes.   CV: Normal rate and rhythm. Systolic murmur 3/6 heard best over right sternal border.   ABDOMEN: soft, nontender, no hepatosplenomegaly, no masses and bowel sounds normal  MS: no gross musculoskeletal defects noted, no edema  SKIN: dry and warm      Diagnostic Test Results:  none      ASSESSMENT/PLAN:                                                    1. Severe aortic stenosis  Denies dizziness or syncopal episodes. Started on oxygen at HS for comfort. She is more alert during the day.     2. Advance Care Planning  Hospice enrolled was just re certified.     3. Chronic pain of left knee  Ongoing. Has had joint injections and has PRN tramadol.     4. CKD (chronic kidney disease) stage 3, GFR 30-59 ml/min  Stable.         [facility]  60min spent in direct face to face time with this patient daughter and group home PCA greater than 50% in counseling  AS, dementia as stated above and coordination of care as stated above .

## 2017-05-23 NOTE — MR AVS SNAPSHOT
After Visit Summary   5/23/2017    Felicia Soriano    MRN: [id number]           Patient Information     Date Of Birth          9/23/1926        Visit Information        Provider Department      5/23/2017 11:00 AM Shahrzad Roach APRN CNP [facility]        Today's Diagnoses     Severe aortic stenosis    -  1    Advance Care Planning        Chronic pain of left knee        CKD (chronic kidney disease) stage 3, GFR 30-59 ml/min        Arthritis of knee          Care Instructions    Today's visit:      DME order for walker      We will be back 6/14 at 11 am.          Follow-ups after your visit        Your next 10 appointments already scheduled     Jun 14, 2017 11:00 AM CDT   Return Visit with AHSAN Gerard CNP   [facility] ([facility])    [address]  [address]  [facility] [address]   [phone]            Jul 12, 2017 11:00 AM CDT   Return Visit with AHSAN Gerard CNP   [facility] ([facility])    [address]  [address]  [facility] [address]   [phone]            Aug 09, 2017 11:00 AM CDT   Return Visit with AHSAN Gerard CNP   [facility] ([facility])    [address]  [address]  [facility] [address]   [phone]            Sep 13, 2017 11:00 AM CDT   Return Visit with AHSAN Gerard CNP   [facility] ([facility])    [address]  [address]  [facility] [address]   [phone]            Oct 11, 2017 11:00 AM CDT   Return Visit with AHSAN Gerard CNP   [facility] ([facility])    [address]  [address]  [facility] [address]   [phone]              Who to contact     If you have questions or need follow up information about today's clinic visit or your schedule  please contact [facility] directly at [phone].  Normal or non-critical lab and imaging results will be communicated to you by MyChart, letter or phone within 4 business days after the clinic has received the results. If you do not hear from us within 7 days, please contact the clinic through [employer] or phone. If you have a critical or abnormal lab result, we will notify you by phone as soon as possible.  Submit refill requests through [employer] or call your pharmacy and they will forward the refill request to us. Please allow 3 business days for your refill to be completed.          Additional Information About Your Visit        [employer] Information     [employer] gives you secure access to your electronic health record. If you see a primary care provider, you can also send messages to your care team and make appointments. If you have questions, please call your primary care clinic.  If you do not have a primary care provider, please call [phone] and they will assist you.        Care EveryWhere ID     This is your Care EveryWhere ID. This could be used by other organizations to access your [facility] medical records  [id number]        Your Vitals Were     Pulse Respirations Pulse Oximetry BMI (Body Mass Index)          82 14 93% 31.64 kg/m2         Blood Pressure from Last 3 Encounters:   05/23/17 122/70   04/12/17 101/63   01/11/17 124/60    Weight from Last 3 Encounters:   05/23/17 196 lb (88.9 kg)   03/30/16 223 lb 6.4 oz (101.3 kg)   03/28/16 218 lb 9.6 oz (99.2 kg)              Today, you had the following     No orders found for display         Today's Medication Changes          These changes are accurate as of: 5/23/17 11:59 PM.  If you have any questions, ask your nurse or doctor.               These medicines have changed or have updated prescriptions.        Dose/Directions    * order for DME   This may have changed:  Another medication with the same name was added. Make sure you  understand how and when to take each.   Used for:  Essential hypertension        Equipment being ordered: blood pressure kit , cuff size large   Quantity:  1 Device   Refills:  0       * order for DME   This may have changed:  Another medication with the same name was added. Make sure you understand how and when to take each.   Used for:  Hospital discharge follow-up, Physical deconditioning, Heart failure with preserved ejection fraction (H), Generalized edema        Equipment being ordered: Fully Electronic Hospital Bed   Quantity:  1 Device   Refills:  0       * order for DME   This may have changed:  Another medication with the same name was added. Make sure you understand how and when to take each.   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 Make sure you understand how and when to take each.   Used for:  Arthritis of left knee, Arthritis of both knees        Equipment being ordered: chair lift  For severe arthritis of left knee   Quantity:  1 Device   Refills:  0       * order for DME   This may have changed:  You were already taking a medication with the same name, and this prescription was added. Make sure you understand how and when to take each.   Used for:  Severe aortic stenosis, Arthritis of knee        Walker with wheels. [employer].   Quantity:  1 Device   Refills:  0       * Notice:  This list has 5 medication(s) that are the same as other medications prescribed for you. Read the directions carefully, and ask your doctor or other care provider to review them with you.         Where to get your medicines      Some of these will need a paper prescription and others can be bought over the counter.  Ask your nurse if you have questions.     Bring a paper prescription for  each of these medications     order for DME                Primary Care Provider Office Phone # Fax #    AHSAN Gerard [facility] [phone]       [facility] [address] [place] [address]  [facility] [address]        Thank you!     Thank you for choosing [facility]  for your care. Our goal is always to provide you with excellent care. Hearing back from our patients is one way we can continue to improve our services. Please take a few minutes to complete the written survey that you may receive in the mail after your visit with us. Thank you!             Your Updated Medication List - Protect others around you: Learn how to safely use, store and throw away your medicines at www.disposemymeds.org.          This list is accurate as of: 5/23/17 11:59 PM.  Always use your most recent med list.                   Brand Name Dispense Instructions for use    acetaminophen 325 MG tablet    TYLENOL    100 tablet    Take 2 tablets (650 mg) by mouth every 6 hours as needed for mild pain       albuterol 108 (90 BASE) MCG/ACT Inhaler    PROAIR HFA/PROVENTIL HFA/VENTOLIN HFA    1 Inhaler    Inhale 2 puffs into the lungs every 6 hours as needed for shortness of breath / dyspnea or wheezing       aspirin 81 MG EC tablet     30 tablet    Take 1 tablet (81 mg) by mouth daily       docusate sodium 100 MG capsule    COLACE    60 capsule    Take 1 capsule (100 mg) by mouth daily       furosemide 20 MG tablet    LASIX    90 tablet    TAKE 20MG (1 TABLET) BY MOUTH EVERY DAY       melatonin 1 MG Tabs tablet      Take 2 mg by mouth nightly as needed for sleep       * order for DME     1 Device    Equipment being ordered: blood pressure kit , cuff size large       * order for DME     1 Device    Equipment being ordered: Fully Electronic Hospital Bed       * order for DME     1 Device    Equipment being ordered: chair lift       * order for DME     1 Device    Equipment being ordered: chair lift   For severe arthritis of left knee       * order for DME     1 Device    Walker with wheels. [employer].       phenazopyridine 100 MG tablet    PYRIDIUM    12 tablet    Take 1 tablet (100 mg) by mouth 3 times daily as needed for urinary tract discomfort       polyethylene glycol powder    MIRALAX/GLYCOLAX     Take 17 g by mouth daily as needed for constipation       potassium chloride 10 MEQ tablet    K-TAB,KLOR-CON    30 tablet    TAKE 10MEQ (1 TABLET) BY MOUTH EVERY DAY.       potassium chloride SA 10 MEQ CR tablet    K-DUR/KLOR-CON M    30 tablet    TAKE 10MEQ (1 TABLET) BY MOUTH EVERY DAY       QUEtiapine 25 MG tablet    SEROQUEL    60 tablet    Take 1 tablet (25 mg) by mouth At Bedtime New increase       senna-docusate 8.6-50 MG per tablet    SENOKOT-S;PERICOLACE     Take 1 tablet by mouth 2 times daily as needed for constipation       traMADol 50 MG tablet    ULTRAM    10 tablet    Take 1 tablet (50 mg) by mouth every 6 hours as needed for moderate pain       Vitamin D (Cholecalciferol) 1000 UNITS Tabs     180 tablet    Take 2,000 Units by mouth daily       * Notice:  This list has 5 medication(s) that are the same as other medications prescribed for you. Read the directions carefully, and ask your doctor or other care provider to review them with you.

## 2017-05-24 ENCOUNTER — TELEPHONE (OUTPATIENT)
Dept: FAMILY MEDICINE | Facility: CLINIC | Age: 82
End: 2017-05-24

## 2017-05-24 VITALS
DIASTOLIC BLOOD PRESSURE: 70 MMHG | SYSTOLIC BLOOD PRESSURE: 122 MMHG | RESPIRATION RATE: 14 BRPM | WEIGHT: 196 LBS | BODY MASS INDEX: 31.64 KG/M2 | HEART RATE: 82 BPM | OXYGEN SATURATION: 93 %

## 2017-06-07 ENCOUNTER — TELEPHONE (OUTPATIENT)
Dept: FAMILY MEDICINE | Facility: CLINIC | Age: 82
End: 2017-06-07

## 2017-06-07 ENCOUNTER — CARE COORDINATION (OUTPATIENT)
Dept: GERIATRIC MEDICINE | Facility: CLINIC | Age: 82
End: 2017-06-07

## 2017-06-07 DIAGNOSIS — J30.2 SEASONAL ALLERGIC RHINITIS, UNSPECIFIED ALLERGIC RHINITIS TRIGGER: Primary | ICD-10-CM

## 2017-06-07 RX ORDER — CETIRIZINE HYDROCHLORIDE 10 MG/1
10 TABLET ORAL DAILY PRN
Qty: 30 TABLET | Refills: 1 | Status: SHIPPED | OUTPATIENT
Start: 2017-06-07 | End: 2017-07-12

## 2017-06-07 NOTE — PROGRESS NOTES
Checked with [facility] and they received the PCP order for the deluxe walker and it will be delivered tomorrow.  SANTHOSH Oneal, [facility]   Tel [phone]  Fax [phone]

## 2017-06-07 NOTE — TELEPHONE ENCOUNTER
Returned Robyn's call and she said pt has a runny nose, watery eyes and sneezing.  Pt's dtr told Robyn she thinks the pt has a history of seasonal allergies.    Pended order for Zyrtec 10 mg daily PRN to be sent to [facility] if Wandy Roach NP, thinks this is appropriate.    Routing to Wandy.

## 2017-06-07 NOTE — TELEPHONE ENCOUNTER
ALYSSA Carlson from pt's  is requesting an order for 10 mg zyrtec PO daily PRN as needed. Robyn states pt has been having allergy symptoms.

## 2017-06-07 NOTE — TELEPHONE ENCOUNTER
Called Robyn back and left [facility].    Shireen Pearson RN

## 2017-06-14 ENCOUNTER — OFFICE VISIT (OUTPATIENT)
Dept: FAMILY MEDICINE | Facility: CLINIC | Age: 82
End: 2017-06-14
Payer: MEDICARE

## 2017-06-14 VITALS — HEART RATE: 69 BPM | SYSTOLIC BLOOD PRESSURE: 112 MMHG | OXYGEN SATURATION: 92 % | DIASTOLIC BLOOD PRESSURE: 70 MMHG

## 2017-06-14 DIAGNOSIS — F02.818 DEMENTIA DUE TO MEDICAL CONDITION WITH BEHAVIORAL DISTURBANCE (H): ICD-10-CM

## 2017-06-14 DIAGNOSIS — I35.0 SEVERE AORTIC STENOSIS: Primary | ICD-10-CM

## 2017-06-14 DIAGNOSIS — Z71.89 ADVANCED DIRECTIVES, COUNSELING/DISCUSSION: Chronic | ICD-10-CM

## 2017-06-14 PROCEDURE — 99348 HOME/RES VST EST LOW MDM 30: CPT | Mod: GW | Performed by: NURSE PRACTITIONER

## 2017-06-14 NOTE — PROGRESS NOTES
SUBJECTIVE:                                                    Felicia Soriano is a 89 year old female who with a complex medical history significant for CKD stage 3, advanced dementia, Congestive heart failure, severe aortic stenosis, chronic knee pain is being followed by the complex care program and is seen in the home today for the following health issues:     All communication held between PCP and  RN Robyn and Olive daughter. Patient is hospice enrolled. Was just re certified with AS diagnosis instead of Dementia.       Concern - Advanced dementia      Onset: 2 years ago moderate to severe    Description:   Story telling, memory issues, wondering outside in the winter, some periods of incontinence urinary/bowel.     Intensity: moderate    Progression of Symptoms:  Stable.     Accompanying Signs & Symptoms:  delirium and agitation      Previous history of similar problem: yes    Precipitating factors:   Worsened by: nothing    Alleviating factors:  Improved by: Seroquel       Therapies Tried and outcome:seroquel     Medical devices  Received new walker.      Joint Pain Left knee pain f/u     Onset: years     Description:   Location: left knee  Character: Dull ache constant     Intensity: moderate    Progression of Symptoms: same    Accompanying Signs & Symptoms:  Other symptoms: none   History:   Previous similar pain: She had a fall last March 2016 and since this time her left knee hurts.     Precipitating factors:   Trauma or overuse: YES- as stated she had a fall.     Alleviating factors:  Improved by: ice, injections        Therapies Tried and outcome: had joint injection last week and effective.     Chronic Kidney Disease Follow-up      Current NSAID use?  No    Severe Aortic Stenosis  She's on oxygen 2L at night now. Sleeps most of the day. No syncopal episodes.     Problem list and histories reviewed & adjusted, as indicated.  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     Malignant neoplasm of breast (female) (H)     Vitamin D deficiency     Essential hypertension     Renal sclerosis     Edema     Advance Care Planning     CKD (chronic kidney disease) stage 3, GFR 30-59 ml/min     Heart failure with preserved ejection fraction (H)     Depression     Weakness     Excessive sleepiness     Flat affect     Infectious encephalopathy     Benign hypertensive heart and CKD, 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    Social History   Substance Use Topics     Smoking status: Never Smoker     Smokeless tobacco: Never Used     Alcohol use No     Family History   Problem Relation Age of Onset     Parkinsonism Father      Alzheimer Disease Mother          Current Outpatient Prescriptions   Medication Sig Dispense Refill     cetirizine (ZYRTEC) 10 MG tablet Take 1 tablet (10 mg) by mouth daily as needed for allergies 30 tablet 1     order for DME Walker with wheels. [employer]. 1 Device 0     potassium chloride (K-TAB,KLOR-CON) 10 MEQ tablet TAKE 10MEQ (1 TABLET) BY MOUTH EVERY DAY. 30 tablet 0     QUEtiapine (SEROQUEL) 25 MG tablet Take 1 tablet (25 mg) by mouth At Bedtime New increase 60 tablet 0     potassium chloride SA (K-DUR/KLOR-CON M) 10 MEQ CR tablet TAKE 10MEQ (1 TABLET) BY MOUTH EVERY DAY 30 tablet 0     furosemide (LASIX) 20 MG tablet TAKE 20MG (1 TABLET) BY MOUTH EVERY DAY 90 tablet 3     traMADol (ULTRAM) 50 MG tablet Take 1 tablet (50 mg) by mouth every 6 hours as needed for moderate pain 10 tablet 0     Vitamin D, Cholecalciferol, 1000 UNITS TABS Take 2,000 Units by mouth daily 180 tablet 3     aspirin 81 MG EC tablet Take 1 tablet  (81 mg) by mouth daily 30 tablet 11     acetaminophen (TYLENOL) 325 MG tablet Take 2 tablets (650 mg) by mouth every 6 hours as needed for mild pain 100 tablet 3     docusate sodium (COLACE) 100 MG capsule Take 1 capsule (100 mg) by mouth daily 60 capsule 3     order for DME Equipment being ordered: chair lift   For severe arthritis of left knee 1 Device 0     order for DME Equipment being ordered: chair lift 1 Device 0     phenazopyridine (PYRIDIUM) 100 MG tablet Take 1 tablet (100 mg) by mouth 3 times daily as needed for urinary tract discomfort 12 tablet 0     order for DME Equipment being ordered: Fully Electronic Hospital Bed 1 Device 0     melatonin 1 MG TABS Take 2 mg by mouth nightly as needed for sleep       senna-docusate (SENOKOT-S;PERICOLACE) 8.6-50 MG per tablet Take 1 tablet by mouth 2 times daily as needed for constipation       polyethylene glycol (MIRALAX/GLYCOLAX) powder Take 17 g by mouth daily as needed for constipation       order for DME Equipment being ordered: blood pressure kit , cuff size large 1 Device 0     albuterol (PROAIR HFA, PROVENTIL HFA, VENTOLIN HFA) 108 (90 BASE) MCG/ACT inhaler Inhale 2 puffs into the lungs every 6 hours as needed for shortness of breath / dyspnea or wheezing 1 Inhaler 0     Allergies   Allergen Reactions     Tape [Adhesive Tape] Rash     Paper tape     Recent Labs   Lab Test  12/30/16   1414  11/13/16   1900  03/24/16 03/13/16   1545   09/28/15   0904   A1C   --    --    --   5.4   --    --    --    --    ALT  13  14   --    --    --   18   --    --    CR  1.04  0.96   < >  0.84   < >  0.93   < >  0.94   GFRESTIMATED  50*  54*   < >  >60   < >  57*   < >  56*   GFRESTBLACK  60*  66   < >  >60   < >  69   < >  68   POTASSIUM  4.2  3.7   < >  4.1   < >  3.9   < >  3.7   TSH   --    --    --   4.05   --    --    --   2.09    < > = values in this interval not displayed.      BP Readings from Last 3 Encounters:   05/23/17 122/70   04/12/17 101/63   01/11/17 124/60     Wt Readings from Last 3 Encounters:   05/23/17 196 lb (88.9 kg)   03/30/16 223 lb 6.4 oz (101.3 kg)   03/28/16 218 lb 9.6 oz (99.2 kg)            Labs reviewed in EPIC  Problem list, Medication list, Allergies, and Medical/Social/Surgical histories reviewed in [facility] and updated as appropriate.    ROS:  Constitutional, HEENT, cardiovascular, pulmonary, GI, , musculoskeletal, neuro, skin, endocrine and psych systems are negative, except as otherwise noted.    OBJECTIVE:                                                    /70  Pulse 69  SpO2 92%  There is no height or weight on file to calculate BMI.  GENERAL: frail elderly, up in recliner chair and alert today.   EYES:  PERRL and conjunctivae and sclerae normal  RESP: lungs clear to auscultation - no rales, rhonchi or wheezes.   CV: Normal rate and rhythm. Systolic murmur 3/6 heard best over right sternal border.   ABDOMEN: soft, nontender, no hepatosplenomegaly, no masses and bowel sounds normal  MS: no gross musculoskeletal defects noted, no edema  SKIN: dry and warm      Diagnostic Test Results:  none      ASSESSMENT/PLAN:                                                    1. Severe aortic stenosis  On oxygen at night for comfort. Hospice enrolled.     2. 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 Mood controlled with seroquel.     3. Advance Care Planning  Hospice enrolled.         AHSAN Milan [facility]  30min spent in direct face to face time with this patient daughter and group home PCA greater than 50% in counseling  AS, dementia as stated above and coordination of care as stated above .

## 2017-06-14 NOTE — MR AVS SNAPSHOT
After Visit Summary   6/14/2017    Felicia Soriano    MRN: [id number]           Patient Information     Date Of Birth          9/23/1926        Visit Information        Provider Department      6/14/2017 11:00 AM Shahrzad Roach APRN CNP 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 ([facility])    [address]  [address]  [facility] [address]   [phone]            Aug 09, 2017 11:00 AM CDT   Return Visit with AHSAN Gerard CNP   [facility] ([facility])    [address]  [address]  [facility] [address]   [phone]            Sep 13, 2017 11:00 AM CDT   Return Visit with AHSAN Gerard CNP   [facility] ([facility])    [address]  [address]  [facility] [address]   [phone]            Oct 11, 2017 11:00 AM CDT   Return Visit with HASAN Gerard CNP   [facility] ([facility])    [address]  [address]  [facility] [address] [phone]              Who to contact     If you have questions or need follow up information about today's clinic visit or your schedule please contact [facility] directly at [phone].  Normal or non-critical lab and imaging results will be communicated to you by MyChart, letter or phone within 4 business days after the clinic has received the results. If you do not hear from us within 7 days, please contact the clinic through MyChart or phone.  If you have a critical or abnormal lab result, we will notify you by phone as soon as possible.  Submit refill requests through [employer] or call your pharmacy and they will forward the refill request to us. Please allow 3 business days for your refill to be completed.          Additional Information About Your Visit        [employer] Information     [employer] gives you secure access to your electronic health record. If you see a primary care provider, you can also send messages to your care team and make appointments. If you have questions, please call your primary care clinic.  If you do not have a primary care provider, please call [phone] and they will assist you.        Care EveryWhere ID     This is your Care EveryWhere ID. This could be used by other organizations to access your [place] medical records  [id number]        Your Vitals Were     Pulse Pulse Oximetry                69 92%           Blood Pressure from Last 3 Encounters:   06/14/17 112/70   05/23/17 122/70   04/12/17 101/63    Weight from Last 3 Encounters:   05/23/17 196 lb (88.9 kg)   03/30/16 223 lb 6.4 oz (101.3 kg)   03/28/16 218 lb 9.6 oz (99.2 kg)              Today, you had the following     No orders found for display       Primary Care Provider Office Phone # Fax #    AHSAN Gerard [facility] [phone]       [facility] CARE [address] [facility] [address]  [facility] [address]        Thank you!     Thank you for choosing [facility] CARE [facility]  for your care. Our goal is always to provide you with excellent care. Hearing back from our patients is one way we can continue to improve our services. Please take a few minutes to complete the written survey that you may receive in the mail after your visit with us. Thank you!             Your Updated Medication List - Protect others around you: Learn how to safely use, store and throw away your medicines at www.disposemymeds.org.          This list is accurate  as of: 6/14/17 11:59 PM.  Always use your most recent med list.                   Brand Name Dispense Instructions for use    acetaminophen 325 MG tablet    TYLENOL    100 tablet    Take 2 tablets (650 mg) by mouth every 6 hours as needed for mild pain       albuterol 108 (90 BASE) MCG/ACT Inhaler    PROAIR HFA/PROVENTIL HFA/VENTOLIN HFA    1 Inhaler    Inhale 2 puffs into the lungs every 6 hours as needed for shortness of breath / dyspnea or wheezing       aspirin 81 MG EC tablet     30 tablet    Take 1 tablet (81 mg) by mouth daily       cetirizine 10 MG tablet    zyrTEC    30 tablet    Take 1 tablet (10 mg) by mouth daily as needed for allergies       docusate sodium 100 MG capsule    COLACE    60 capsule    Take 1 capsule (100 mg) by mouth daily       furosemide 20 MG tablet    LASIX    90 tablet    TAKE 20MG (1 TABLET) BY MOUTH EVERY DAY       melatonin 1 MG Tabs tablet      Take 2 mg by mouth nightly as needed for sleep       * order for DME     1 Device    Equipment being ordered: blood pressure kit , cuff size large       * order for DME     1 Device    Equipment being ordered: Fully Electronic Hospital Bed       * order for DME     1 Device    Equipment being ordered: chair lift       * order for DME     1 Device    Equipment being ordered: chair lift  For severe arthritis of left knee       * order for DME     1 Device    Walker with wheels. [employer].       phenazopyridine 100 MG tablet    PYRIDIUM    12 tablet    Take 1 tablet (100 mg) by mouth 3 times daily as needed for urinary tract discomfort       polyethylene glycol powder    MIRALAX/GLYCOLAX     Take 17 g by mouth daily as needed for constipation       potassium chloride 10 MEQ tablet    K-TAB,KLOR-CON    30 tablet    TAKE 10MEQ (1 TABLET) BY MOUTH EVERY DAY.       potassium chloride SA 10 MEQ CR tablet    K-DUR/KLOR-CON M    30 tablet    TAKE 10MEQ (1 TABLET) BY MOUTH EVERY DAY       QUEtiapine 25 MG tablet    SEROQUEL    60 tablet    Take 1  tablet (25 mg) by mouth At Bedtime New increase       senna-docusate 8.6-50 MG per tablet    SENOKOT-S;PERICOLACE     Take 1 tablet by mouth 2 times daily as needed for constipation       traMADol 50 MG tablet    ULTRAM    10 tablet    Take 1 tablet (50 mg) by mouth every 6 hours as needed for moderate pain       Vitamin D (Cholecalciferol) 1000 UNITS Tabs     180 tablet    Take 2,000 Units by mouth daily       * Notice:  This list has 5 medication(s) that are the same as other medications prescribed for you. Read the directions carefully, and ask your doctor or other care provider to review them with you.

## 2017-06-22 ENCOUNTER — CARE COORDINATION (OUTPATIENT)
Dept: GERIATRIC MEDICINE | Facility: CLINIC | Age: 82
End: 2017-06-22

## 2017-06-22 NOTE — PROGRESS NOTES
Received voice mail message from client's daughter Olive and returned call.  She states she got a form in the mail indicating it his application for [facility] and she would like to know if she needs to complete it.  Told her what the form is and that she needs to call her mother's financial worker and schedule phone interview to renew [facility].  At the same, time she has ask if the [facility] needs to entire document completed.  SANTHOSH Oneal, [facility]   Tel [phone]  Fax [phone]

## 2017-06-28 DIAGNOSIS — T50.2X5A DIURETIC-INDUCED HYPOKALEMIA: ICD-10-CM

## 2017-06-28 DIAGNOSIS — F02.818 DEMENTIA DUE TO MEDICAL CONDITION WITH BEHAVIORAL DISTURBANCE (H): ICD-10-CM

## 2017-06-28 DIAGNOSIS — E87.6 DIURETIC-INDUCED HYPOKALEMIA: ICD-10-CM

## 2017-06-28 RX ORDER — POTASSIUM CHLORIDE 750 MG/1
TABLET, EXTENDED RELEASE ORAL
Qty: 30 TABLET | Refills: 0 | Status: SHIPPED | OUTPATIENT
Start: 2017-06-28 | End: 2017-08-14

## 2017-06-28 RX ORDER — QUETIAPINE FUMARATE 25 MG/1
TABLET, FILM COATED ORAL
Qty: 30 TABLET | Refills: 0 | Status: SHIPPED | OUTPATIENT
Start: 2017-06-28 | End: 2017-08-25

## 2017-07-12 ENCOUNTER — OFFICE VISIT (OUTPATIENT)
Dept: FAMILY MEDICINE | Facility: CLINIC | Age: 82
End: 2017-07-12
Payer: MEDICARE

## 2017-07-12 VITALS — HEART RATE: 72 BPM | OXYGEN SATURATION: 93 %

## 2017-07-12 DIAGNOSIS — M17.10 ARTHRITIS OF KNEE: ICD-10-CM

## 2017-07-12 DIAGNOSIS — F02.818 DEMENTIA DUE TO MEDICAL CONDITION WITH BEHAVIORAL DISTURBANCE (H): Primary | ICD-10-CM

## 2017-07-12 PROCEDURE — 99349 HOME/RES VST EST MOD MDM 40: CPT | Mod: GW | Performed by: NURSE PRACTITIONER

## 2017-07-12 RX ORDER — ACETAMINOPHEN 325 MG/1
650 TABLET ORAL 2 TIMES DAILY
Qty: 100 TABLET | Refills: 3 | Status: SHIPPED | OUTPATIENT
Start: 2017-07-12 | End: 2017-11-22

## 2017-07-12 NOTE — PATIENT INSTRUCTIONS
Will aski WILLIE Hays to come out and give you a joint injection in your right knee    Use Lidocaine gel 5% on your knee 2-3 times daily for pain relief.     Continue with Tylenol 650 mg twice daily.

## 2017-07-12 NOTE — PROGRESS NOTES
SUBJECTIVE:                                                    Felicia Soriano is a 89 year old female who with a medical history significant for CKD stage 3, advanced dementia, Congestive heart failure, severe aortic stenosis, chronic knee pain is being followed by the complex care program and is seen in the home today for the following health issues:     All communication held between PCP and  RN Robyn and Olive daughter. Patient is hospice enrolled. Her daughter thinks she is declining. She is weaker and she believes she lost weight. I actually think she looks better today than she has in months. She spoke a few words with PCP.      Chronic right knee pain  Chronic issue. Pain at rest and during ambulation. Had joint injection a few months ago which seemed to help. Has been taking acetaminophen on PRN basis only.     Concern - Advanced dementia      Onset: 2 years ago moderate to severe    Description:   Story telling, memory issues, wondering outside in the winter, some periods of incontinence urinary/bowel.     Intensity: moderate    Progression of Symptoms:  Stable.     Accompanying Signs & Symptoms:  delirium and agitation      Previous history of similar problem: yes    Precipitating factors:   Worsened by: nothing    Alleviating factors:  Improved by: Seroquel       Therapies Tried and outcome:seroquel     Chronic Kidney Disease Follow-up      Current NSAID use?  No    Severe Aortic Stenosis  She's on oxygen 2L at night now. Sleeps most of the day. No syncopal episodes.     Problem list and histories reviewed & adjusted, as indicated.  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     Malignant neoplasm of breast (female) (H)     Vitamin D deficiency     Essential hypertension     Renal sclerosis     Edema     Advance Care Planning     CKD  (chronic kidney disease) stage 3, GFR 30-59 ml/min     Heart failure with preserved ejection fraction (H)     Depression     Weakness     Excessive sleepiness     Flat affect     Infectious encephalopathy     Benign hypertensive heart and CKD, 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    Social History   Substance Use Topics     Smoking status: Never Smoker     Smokeless tobacco: Never Used     Alcohol use No     Family History   Problem Relation Age of Onset     Parkinsonism Father      Alzheimer Disease Mother          Current Outpatient Prescriptions   Medication Sig Dispense Refill     potassium chloride (K-TAB,KLOR-CON) 10 MEQ tablet TAKE 10MEQ (1 TABLET) BY MOUTH EVERY DAY. 30 tablet 0     QUEtiapine (SEROQUEL) 25 MG tablet TAKE 25MG (1 TABLET) BY MOUTH AT BEDTIME 30 tablet 0     cetirizine (ZYRTEC) 10 MG tablet Take 1 tablet (10 mg) by mouth daily as needed for allergies 30 tablet 1     order for DME Walker with wheels. [employer]. 1 Device 0     potassium chloride SA (K-DUR/KLOR-CON M) 10 MEQ CR tablet TAKE 10MEQ (1 TABLET) BY MOUTH EVERY DAY 30 tablet 0     furosemide (LASIX) 20 MG tablet TAKE 20MG (1 TABLET) BY MOUTH EVERY DAY 90 tablet 3     traMADol (ULTRAM) 50 MG tablet Take 1 tablet (50 mg) by mouth every 6 hours as needed for moderate pain 10 tablet 0     Vitamin D, Cholecalciferol, 1000 UNITS TABS Take 2,000 Units by mouth daily 180 tablet 3     aspirin 81 MG EC tablet Take 1 tablet (81 mg) by mouth daily 30 tablet 11     acetaminophen (TYLENOL) 325 MG tablet Take 2 tablets (650 mg) by mouth every 6 hours as needed for mild pain 100 tablet 3     docusate sodium (COLACE) 100 MG capsule Take 1 capsule (100 mg) by mouth daily 60 capsule 3     order for DME Equipment being ordered: chair  lift   For severe arthritis of left knee 1 Device 0     order for DME Equipment being ordered: chair lift 1 Device 0     phenazopyridine (PYRIDIUM) 100 MG tablet Take 1 tablet (100 mg) by mouth 3 times daily as needed for urinary tract discomfort 12 tablet 0     order for DME Equipment being ordered: Fully Electronic Hospital Bed 1 Device 0     melatonin 1 MG TABS Take 2 mg by mouth nightly as needed for sleep       senna-docusate (SENOKOT-S;PERICOLACE) 8.6-50 MG per tablet Take 1 tablet by mouth 2 times daily as needed for constipation       polyethylene glycol (MIRALAX/GLYCOLAX) powder Take 17 g by mouth daily as needed for constipation       order for DME Equipment being ordered: blood pressure kit , cuff size large 1 Device 0     albuterol (PROAIR HFA, PROVENTIL HFA, VENTOLIN HFA) 108 (90 BASE) MCG/ACT inhaler Inhale 2 puffs into the lungs every 6 hours as needed for shortness of breath / dyspnea or wheezing 1 Inhaler 0     Allergies   Allergen Reactions     Tape [Adhesive Tape] Rash     Paper tape     Recent Labs   Lab Test  12/30/16   1414  11/13/16   1900  03/24/16 03/13/16   1545   09/28/15   0904   A1C   --    --    --   5.4   --    --    --    --    ALT  13  14   --    --    --   18   --    --    CR  1.04  0.96   < >  0.84   < >  0.93   < >  0.94   GFRESTIMATED  50*  54*   < >  >60   < >  57*   < >  56*   GFRESTBLACK  60*  66   < >  >60   < >  69   < >  68   POTASSIUM  4.2  3.7   < >  4.1   < >  3.9   < >  3.7   TSH   --    --    --   4.05   --    --    --   2.09    < > = values in this interval not displayed.      BP Readings from Last 3 Encounters:   06/14/17 112/70   05/23/17 122/70   04/12/17 101/63    Wt Readings from Last 3 Encounters:   05/23/17 196 lb (88.9 kg)   03/30/16 223 lb 6.4 oz (101.3 kg)   03/28/16 218 lb 9.6 oz (99.2 kg)            Labs reviewed in EPIC  Problem list, Medication list, Allergies, and Medical/Social/Surgical histories reviewed in EPIC and updated as  appropriate.    ROS:  Constitutional, HEENT, cardiovascular, pulmonary, GI, , musculoskeletal, neuro, skin, endocrine and psych systems are negative, except as otherwise noted.    OBJECTIVE:                                                    Pulse 72  SpO2 93%  There is no height or weight on file to calculate BMI.  GENERAL: frail elderly, up in recliner chair and alert today.   EYES:  PERRL and conjunctivae and sclerae normal  RESP: lungs clear to auscultation - no rales, rhonchi or wheezes.   CV: Normal rate and rhythm. Systolic murmur 3/6 heard best over right sternal border.   ABDOMEN: soft, nontender, no hepatosplenomegaly, no masses and bowel sounds normal  MS: no gross musculoskeletal defects noted, no edema  SKIN: dry and warm      Diagnostic Test Results:  none      ASSESSMENT/PLAN:                                                        1. 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 Hospice enrolled with primary dx severe aortic stenosis. Daughter believes she is declining. I actually think she looks good today. Eating small meals. Ambulating with walker short distances. She does sleep the majority of the day.     2. Arthritis of knee  Referral for joint injection. Changed APAP to BID dosing plus lidocaine 5 % ordered.     AHSAN [facility]  30min spent in direct face to face time with this patient daughter and group home PCA greater than 50% in counseling  AS, dementia as stated above and coordination of care as stated above .

## 2017-07-12 NOTE — MR AVS SNAPSHOT
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     Continue with Tylenol 650 mg twice daily.               Follow-ups after your visit        Your next 10 appointments already scheduled     Aug 09, 2017 11:00 AM CDT   [facility] Visit with AHSAN Gerard C[facility]   [facility]    [address]  [address]  [facility] [address] [phone]            Sep 13, 2017 11:00 AM CDT   Return Visit with AHSAN Gerard CNP   [facility]    [address]  [address]  [facility] [address] [phone]            Oct 11, 2017 11:00 AM CDT   Return Visit with AHSAN Gerard CNP   [facility]    [address]  [address]  [facility] [address] [phone]              Who to contact     If you have questions or need follow up information about today's clinic visit or your schedule please contact [facility] directly at [phone].  Normal or non-critical lab and imaging results will be communicated to you by MyChart, letter or phone within 4 business days after the clinic has received the results. If you do not hear from us within 7 days, please contact the clinic through MyChart or phone. If you have a critical or abnormal lab result, we will notify you by phone as soon  as possible.  Submit refill requests through [employer] or call your pharmacy and they will forward the refill request to us. Please allow 3 business days for your refill to be completed.          Additional Information About Your Visit        [employer]VA Information     [employer] gives you secure access to your electronic health record. If you see a primary care provider, you can also send messages to your care team and make appointments. If you have questions, please call your primary care clinic.  If you do not have a primary care provider, please call [phone] and they will assist you.        Care EveryWhere ID     This is your Care EveryWhere ID. This could be used by other organizations to access your [place] medical records  [id number]        Your Vitals Were     Pulse Pulse Oximetry                72 93%           Blood Pressure from Last 3 Encounters:   06/14/17 112/70   05/23/17 122/70   04/12/17 101/63    Weight from Last 3 Encounters:   05/23/17 196 lb (88.9 kg)   03/30/16 223 lb 6.4 oz (101.3 kg)   03/28/16 218 lb 9.6 oz (99.2 kg)              Today, you had the following     No orders found for display         Today's Medication Changes          These changes are accurate as of: 7/12/17 11:59 PM.  If you have any questions, ask your nurse or doctor.               These medicines have changed or have updated prescriptions.        Dose/Directions    acetaminophen 325 MG tablet   Commonly known as:  TYLENOL   This may have changed:    - when to take this  - reasons to take this   Changed by:  Shahrzad Roach APRN CNP        Dose:  650 mg   Take 2 tablets (650 mg) by mouth 2 times daily   Quantity:  100 tablet   Refills:  3       ALL DAY ALLERGY 10 MG tablet   This may have changed:  See the new instructions.   Used for:  Seasonal allergic rhinitis due to pollen, unspecified chronicity   Generic drug:  cetirizine   Changed by:  Shahrzad Roach APRN CNP        TAKE 10MG (1 TABLET) BY  MOUTH EVERY DAY AS NEEDED FOR ALLERGIES   Quantity:  30 tablet   Refills:  0            Where to get your medicines      These medications were sent to [facility] [place] - [address]  [address] [facility] [address], [facility] [address]     Phone:  [phone]     acetaminophen 325 MG tablet    ALL DAY ALLERGY 10 MG tablet                Primary Care Provider Office Phone # Fax #    Shahrzad Roach, AHSAN [facility] [phone]        CLINICS [facility] CARE [address]  [facility] [address]        Equal Access to Services     [facility] LESLEY : Hadii aad ku hadasho Soomaali, waaxda luqadaha, qaybta kaalmada adeegyada, catie jaimes haydiaz adefrancisco carias . So [facility] [phone].    ATENCIÓN: Si habla español, tiene a cruz disposición servicios gratuitos de asistencia lingüística. [facility] [phone].    We comply with applicable federal civil rights laws and [place] laws. We do not discriminate on the basis of race, color, national origin, age, disability sex, sexual orientation or gender identity.            Thank you!     Thank you for choosing [facility] CARE [facility]  for your care. Our goal is always to provide you with excellent care. Hearing back from our patients is one way we can continue to improve our services. Please take a few minutes to complete the written survey that you may receive in the mail after your visit with us. Thank you!             Your Updated Medication List - Protect others around you: Learn how to safely use, store and throw away your medicines at www.disposemymeds.org.          This list is accurate as of: 7/12/17 11:59 PM.  Always use your most recent med list.                   Brand Name Dispense Instructions for use Diagnosis    acetaminophen 325 MG tablet    TYLENOL    100 tablet    Take 2 tablets (650 mg) by mouth 2 times daily        albuterol 108 (90 BASE) MCG/ACT Inhaler    PROAIR HFA/PROVENTIL HFA/VENTOLIN HFA    1 Inhaler     Inhale 2 puffs into the lungs every 6 hours as needed for shortness of breath / dyspnea or wheezing    Cough, Wheezing       ALL DAY ALLERGY 10 MG tablet   Generic drug:  cetirizine     30 tablet    TAKE 10MG (1 TABLET) BY MOUTH EVERY DAY AS NEEDED FOR ALLERGIES    Seasonal allergic rhinitis due to pollen, unspecified chronicity       aspirin 81 MG EC tablet     30 tablet    Take 1 tablet (81 mg) by mouth daily    Aortic valve disorder       docusate sodium 100 MG capsule    COLACE    60 capsule    Take 1 capsule (100 mg) by mouth daily    Slow transit constipation       furosemide 20 MG tablet    LASIX    90 tablet    TAKE 20MG (1 TABLET) BY MOUTH EVERY DAY    Edema, unspecified type       melatonin 1 MG Tabs tablet      Take 2 mg by mouth nightly as needed for sleep        * order for DME     1 Device    Equipment being ordered: blood pressure kit , cuff size large    Essential hypertension       * order for DME     1 Device    Equipment being ordered: Fully Electronic Hospital Bed    Hospital discharge follow-up, Physical deconditioning, Heart failure with preserved ejection fraction (H), Generalized edema       * order for DME     1 Device    Equipment being ordered: chair lift    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 [employer] [address].    Severe aortic stenosis, Arthritis of knee       phenazopyridine 100 MG tablet    PYRIDIUM    12 tablet    Take 1 tablet (100 mg) by mouth 3 times daily as needed for urinary tract discomfort    Dysuria, Personal history of urinary tract infection       polyethylene glycol powder    MIRALAX/GLYCOLAX     Take 17 g by mouth daily as needed for constipation        potassium chloride 10 MEQ tablet    K-TAB,KLOR-CON     30 tablet    TAKE 10MEQ (1 TABLET) BY MOUTH EVERY DAY.    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 Read the directions carefully, and ask your doctor or other care provider to review them with you.

## 2017-08-08 ENCOUNTER — TELEPHONE (OUTPATIENT)
Dept: FAMILY MEDICINE | Facility: CLINIC | Age: 82
End: 2017-08-08

## 2017-08-08 ENCOUNTER — MYC MEDICAL ADVICE (OUTPATIENT)
Dept: FAMILY MEDICINE | Facility: CLINIC | Age: 82
End: 2017-08-08

## 2017-08-09 ENCOUNTER — OFFICE VISIT (OUTPATIENT)
Dept: FAMILY MEDICINE | Facility: CLINIC | Age: 82
End: 2017-08-09
Payer: MEDICARE

## 2017-08-09 VITALS — OXYGEN SATURATION: 94 % | SYSTOLIC BLOOD PRESSURE: 120 MMHG | HEART RATE: 82 BPM | DIASTOLIC BLOOD PRESSURE: 70 MMHG

## 2017-08-09 DIAGNOSIS — M25.562 CHRONIC PAIN OF LEFT KNEE: ICD-10-CM

## 2017-08-09 DIAGNOSIS — G89.29 CHRONIC PAIN OF LEFT KNEE: ICD-10-CM

## 2017-08-09 DIAGNOSIS — I35.0 SEVERE AORTIC STENOSIS: Primary | ICD-10-CM

## 2017-08-09 DIAGNOSIS — F02.818 DEMENTIA DUE TO MEDICAL CONDITION WITH BEHAVIORAL DISTURBANCE (H): ICD-10-CM

## 2017-08-09 PROCEDURE — 99349 HOME/RES VST EST MOD MDM 40: CPT | Mod: GW | Performed by: NURSE PRACTITIONER

## 2017-08-09 NOTE — MR AVS SNAPSHOT
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        Chronic pain of left knee          Care Instructions    No medication changes              Follow-ups after your visit        Your next 10 appointments already scheduled     Sep 13, 2017 11:00 AM CDT   [facility] AHSAN Gerard C[facility]   [facility]    [address]  [address]  [facility] [address]   [phone]            Oct 11, 2017 11:00 AM CDT   [facility] Visit with AHSAN Gerard CNP   [facility]    [address]  [address]  [facility] [address]   [phone]            Nov 08, 2017 11:00 AM CST   [facility] with AHSAN Gerard C[facility]   [facility]    [address]  [address]  [facility] [address] [phone]            Dec 13, 2017 11:00 AM CST   [facility] Visit with AHSAN Gerard CNP   [facility]    [address]  [address]  [facility] [address] [phone]              Who to contact     If you have questions or need follow up information about today's clinic visit or your schedule please contact [facility] directly at [phone].  Normal or non-critical lab and imaging results will be communicated to you by MyChart, letter or phone within 4 business days after the clinic has received the results. If you do not hear from us within 7 days, please contact the clinic through MyChart or  phone. If you have a critical or abnormal lab result, we will notify you by phone as soon as possible.  Submit refill requests through [employer] or call your pharmacy and they will forward the refill request to us. Please allow 3 business days for your refill to be completed.          Additional Information About Your Visit        [employer] Information     [employer] gives you secure access to your electronic health record. If you see a primary care provider, you can also send messages to your care team and make appointments. If you have questions, please call your primary care clinic.  If you do not have a primary care provider, please call [phone] and they will assist you.        Care EveryWhere ID     This is your Care EveryWhere ID. This could be used by other organizations to access your [facility] medical records  [id number]        Your Vitals Were     Pulse Pulse Oximetry                82 94%           Blood Pressure from Last 3 Encounters:   08/09/17 120/70   06/14/17 112/70   05/23/17 122/70    Weight from Last 3 Encounters:   05/23/17 196 lb (88.9 kg)   03/30/16 223 lb 6.4 oz (101.3 kg)   03/28/16 218 lb 9.6 oz (99.2 kg)              Today, you had the following     No orders found for display       Primary Care Provider Office Phone # Fax #    Shahrzad AHSAN Collins [facility] [phone]       [address] [facility] [address]  [facility] [address]        Equal Access to Services     FERNIE SUTTON : Hadii aad ku hadasho Soomaali, waaxda luqadaha, qaybta kaalmada adeegyada, actie menchacain hayaan messi carias . So [facility] [phone].    ATENCIÓN: Si habla español, tiene a cruz disposición servicios gratuitos de asistencia lingüística. Llame al [phone].    We comply with applicable federal civil rights laws and [place] laws. We do not discriminate on the basis of race, color, national origin, age, disability sex, sexual orientation or gender identity.            Thank you!     Thank you for  choosing [facility]  for your care. Our goal is always to provide you with excellent care. Hearing back from our patients is one way we can continue to improve our services. Please take a few minutes to complete the written survey that you may receive in the mail after your visit with us. Thank you!             Your Updated Medication List - Protect others around you: Learn how to safely use, store and throw away your medicines at www.disposemymeds.org.          This list is accurate as of: 8/9/17 11:59 PM.  Always use your most recent med list.                   Brand Name Dispense Instructions for use Diagnosis    acetaminophen 325 MG tablet    TYLENOL    100 tablet    Take 2 tablets (650 mg) by mouth 2 times daily        albuterol 108 (90 BASE) MCG/ACT Inhaler    PROAIR HFA/PROVENTIL HFA/VENTOLIN HFA    1 Inhaler    Inhale 2 puffs into the lungs every 6 hours as needed for shortness of breath / dyspnea or wheezing    Cough, Wheezing       ALL DAY ALLERGY 10 MG tablet   Generic drug:  cetirizine     30 tablet    TAKE 10MG (1 TABLET) BY MOUTH EVERY DAY AS NEEDED FOR ALLERGIES    Seasonal allergic rhinitis due to pollen, unspecified chronicity       aspirin 81 MG EC tablet     30 tablet    Take 1 tablet (81 mg) by mouth daily    Aortic valve disorder       docusate sodium 100 MG capsule    COLACE    60 capsule    Take 1 capsule (100 mg) by mouth daily    Slow transit constipation       furosemide 20 MG tablet    LASIX    90 tablet    TAKE 20MG (1 TABLET) BY MOUTH EVERY DAY    Edema, unspecified type       melatonin 1 MG Tabs tablet      Take 2 mg by mouth nightly as needed for sleep        * order for DME     1 Device    Equipment being ordered: blood pressure kit , cuff size large    Essential hypertension       * order for DME     1 Device    Equipment being ordered: Fully Electronic Hospital Bed    Hospital discharge follow-up, Physical deconditioning, Heart failure with preserved ejection  fraction (H), Generalized edema       * order for DME     1 Device    Equipment being ordered: chair lift    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 [employer].    Severe aortic stenosis, Arthritis of knee       phenazopyridine 100 MG tablet    PYRIDIUM    12 tablet    Take 1 tablet (100 mg) by mouth 3 times daily as needed for urinary tract discomfort    Dysuria, Personal history of urinary tract infection       polyethylene glycol powder    MIRALAX/GLYCOLAX     Take 17 g by mouth daily as needed for constipation        potassium chloride 10 MEQ tablet    K-TAB,KLOR-CON    30 tablet    TAKE 10MEQ (1 TABLET) BY MOUTH EVERY DAY.    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 Read the directions carefully, and ask your doctor or other care provider to review them with you.

## 2017-08-09 NOTE — PROGRESS NOTES
SUBJECTIVE:                                                    Felicia Soriano is a 89 year old female who with a medical history significant for CKD stage 3, advanced dementia, Congestive heart failure, severe aortic stenosis, chronic knee pain is being followed by the complex care program and is seen in the home today for the following health issues:     All communication held between PCP and  RN Robyn. Patient is hospice enrolled. Robyn reports that she is weaker and her appetite is decreased, eating 2 small meals a day but no weight loss she weighs 202 lbs.       Concern - Advanced dementia      Onset: 2 years ago moderate to severe    Description:   Story telling, memory issues, wondering outside in the winter, some periods of incontinence urinary/bowel.     Intensity: moderate    Progression of Symptoms:  Stable.     Accompanying Signs & Symptoms:  delirium and agitation      Previous history of similar problem: yes    Precipitating factors:   Worsened by: nothing    Alleviating factors:  Improved by: Seroquel       Therapies Tried and outcome:seroquel     Chronic Kidney Disease Follow-up      Current NSAID use?  No    Severe Aortic Stenosis  Denies chest pain, palpitations. Shortness of breath with minimal exertion. She's on oxygen 2L at night now. Sleeps most of the day. No syncopal episodes.     Problem list and histories reviewed & adjusted, as indicated.  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     Malignant neoplasm of breast (female) (H)     Vitamin D deficiency     Essential hypertension     Renal sclerosis     Edema     Advance Care Planning     CKD (chronic kidney disease) stage 3, GFR 30-59 ml/min     Heart failure with preserved ejection fraction (H)     Depression     Weakness     Excessive sleepiness     Flat affect      Infectious encephalopathy     Benign hypertensive heart and CKD, 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    Social History   Substance Use Topics     Smoking status: Never Smoker     Smokeless tobacco: Never Used     Alcohol use No     Family History   Problem Relation Age of Onset     Parkinsonism Father      Alzheimer Disease Mother          Current Outpatient Prescriptions   Medication Sig Dispense Refill     ALL DAY ALLERGY 10 MG tablet TAKE 10MG (1 TABLET) BY MOUTH EVERY DAY AS NEEDED FOR ALLERGIES 30 tablet 0     acetaminophen (TYLENOL) 325 MG tablet Take 2 tablets (650 mg) by mouth 2 times daily 100 tablet 3     potassium chloride (K-TAB,KLOR-CON) 10 MEQ tablet TAKE 10MEQ (1 TABLET) BY MOUTH EVERY DAY. 30 tablet 0     QUEtiapine (SEROQUEL) 25 MG tablet TAKE 25MG (1 TABLET) BY MOUTH AT BEDTIME 30 tablet 0     order for DME Walker with wheels. 99 [employer]. 1 Device 0     potassium chloride SA (K-DUR/KLOR-CON M) 10 MEQ CR tablet TAKE 10MEQ (1 TABLET) BY MOUTH EVERY DAY 30 tablet 0     furosemide (LASIX) 20 MG tablet TAKE 20MG (1 TABLET) BY MOUTH EVERY DAY 90 tablet 3     traMADol (ULTRAM) 50 MG tablet Take 1 tablet (50 mg) by mouth every 6 hours as needed for moderate pain 10 tablet 0     Vitamin D, Cholecalciferol, 1000 UNITS TABS Take 2,000 Units by mouth daily 180 tablet 3     aspirin 81 MG EC tablet Take 1 tablet (81 mg) by mouth daily 30 tablet 11     docusate sodium (COLACE) 100 MG capsule Take 1 capsule (100 mg) by mouth daily 60 capsule 3     order for DME Equipment being ordered: chair lift   For severe arthritis of left knee 1 Device 0     order for DME Equipment being ordered: chair lift 1 Device 0     phenazopyridine (PYRIDIUM) 100 MG tablet Take 1 tablet (100 mg) by mouth 3 times daily  as needed for urinary tract discomfort 12 tablet 0     order for DME Equipment being ordered: Fully Electronic Hospital Bed 1 Device 0     melatonin 1 MG TABS Take 2 mg by mouth nightly as needed for sleep       senna-docusate (SENOKOT-S;PERICOLACE) 8.6-50 MG per tablet Take 1 tablet by mouth 2 times daily as needed for constipation       polyethylene glycol (MIRALAX/GLYCOLAX) powder Take 17 g by mouth daily as needed for constipation       order for DME Equipment being ordered: blood pressure kit , cuff size large 1 Device 0     albuterol (PROAIR HFA, PROVENTIL HFA, VENTOLIN HFA) 108 (90 BASE) MCG/ACT inhaler Inhale 2 puffs into the lungs every 6 hours as needed for shortness of breath / dyspnea or wheezing 1 Inhaler 0     Allergies   Allergen Reactions     Tape [Adhesive Tape] Rash     Paper tape     Recent Labs   Lab Test  12/30/16   1414  11/13/16   1900  03/24/16 03/13/16   1545   09/28/15   0904   A1C   --    --    --   5.4   --    --    --    --    ALT  13  14   --    --    --   18   --    --    CR  1.04  0.96   < >  0.84   < >  0.93   < >  0.94   GFRESTIMATED  50*  54*   < >  >60   < >  57*   < >  56*   GFRESTBLACK  60*  66   < >  >60   < >  69   < >  68   POTASSIUM  4.2  3.7   < >  4.1   < >  3.9   < >  3.7   TSH   --    --    --   4.05   --    --    --   2.09    < > = values in this interval not displayed.      BP Readings from Last 3 Encounters:   06/14/17 112/70   05/23/17 122/70   04/12/17 101/63    Wt Readings from Last 3 Encounters:   05/23/17 196 lb (88.9 kg)   03/30/16 223 lb 6.4 oz (101.3 kg)   03/28/16 218 lb 9.6 oz (99.2 kg)            Labs reviewed in EPIC  Problem list, Medication list, Allergies, and Medical/Social/Surgical histories reviewed in [facility] and updated as appropriate.    ROS:  Constitutional, HEENT, cardiovascular, pulmonary, GI, , musculoskeletal, neuro, skin, endocrine and psych systems are negative, except as otherwise noted.    OBJECTIVE:                                                     There were no vitals taken for this visit.  There is no height or weight on file to calculate BMI.  GENERAL: frail elderly, up in recliner chair and alert today.   EYES:  PERRL and conjunctivae and sclerae normal  RESP: lungs clear to auscultation - no rales, rhonchi or wheezes.   CV: Normal rate and rhythm. Systolic murmur 3/6 heard best over right sternal border.   ABDOMEN: soft, nontender, no hepatosplenomegaly, no masses and bowel sounds normal  MS: no gross musculoskeletal defects noted, no edema  SKIN: dry and warm      Diagnostic Test Results:  none      ASSESSMENT/PLAN:                                                      1. Severe aortic stenosis  Hospice enrolled with primary diagnosis. On oxygen at night. Sleeps most of the day. Decreased appetite. No weight loss.     2. 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 Complete care.     3. Chronic pain of left knee  Scheduled APAP has helped with pain.     AHSAN Milan [facility]  30min spent in direct face to face time with this group home RN  greater than 50% in counseling  AS, dementia as stated above and coordination of care as stated above .

## 2017-08-14 DIAGNOSIS — T50.2X5A DIURETIC-INDUCED HYPOKALEMIA: ICD-10-CM

## 2017-08-14 DIAGNOSIS — E87.6 DIURETIC-INDUCED HYPOKALEMIA: ICD-10-CM

## 2017-08-14 DIAGNOSIS — K59.00 CONSTIPATION, UNSPECIFIED CONSTIPATION TYPE: Primary | ICD-10-CM

## 2017-08-14 RX ORDER — POTASSIUM CHLORIDE 750 MG/1
TABLET, EXTENDED RELEASE ORAL
Qty: 30 TABLET | Refills: 0 | Status: SHIPPED | OUTPATIENT
Start: 2017-08-14

## 2017-08-14 RX ORDER — DOCUSATE SODIUM 50MG AND SENNOSIDES 8.6MG 8.6; 5 MG/1; MG/1
TABLET, FILM COATED ORAL
Qty: 60 TABLET | Refills: 0 | Status: SHIPPED | OUTPATIENT
Start: 2017-08-14

## 2017-08-15 ENCOUNTER — MEDICAL CORRESPONDENCE (OUTPATIENT)
Dept: HEALTH INFORMATION MANAGEMENT | Facility: CLINIC | Age: 82
End: 2017-08-15

## 2017-08-18 ENCOUNTER — CARE COORDINATION (OUTPATIENT)
Dept: GERIATRIC MEDICINE | Facility: CLINIC | Age: 82
End: 2017-08-18

## 2017-08-18 NOTE — PROGRESS NOTES
Received voice mail message from Robyn, owner of [employer], and returned call.  She states she has not received the [facility] Auth starting June 1. She called [facility] but they have no record of an auth request.  Told Robyn it was sent by [employer] on 5/31/17.  Told her that  will have Lila Hannon who handles auths check on and resubmit if necessary.  Asked Lila to call Robyn back at [phone].  SANTHOSH Oneal, [facility]   Tel [phone]  Fax [phone]

## 2017-08-25 DIAGNOSIS — F02.818 DEMENTIA DUE TO MEDICAL CONDITION WITH BEHAVIORAL DISTURBANCE (H): ICD-10-CM

## 2017-08-25 RX ORDER — QUETIAPINE FUMARATE 25 MG/1
TABLET, FILM COATED ORAL
Qty: 30 TABLET | Refills: 0 | Status: SHIPPED | OUTPATIENT
Start: 2017-08-25 | End: 2017-11-09

## 2017-08-29 ENCOUNTER — MEDICAL CORRESPONDENCE (OUTPATIENT)
Dept: HEALTH INFORMATION MANAGEMENT | Facility: CLINIC | Age: 82
End: 2017-08-29

## 2017-09-08 ENCOUNTER — TELEPHONE (OUTPATIENT)
Dept: FAMILY MEDICINE | Facility: CLINIC | Age: 82
End: 2017-09-08

## 2017-09-08 NOTE — TELEPHONE ENCOUNTER
"Robyn from  called with the following update:    Patient refused seroquel last night so she had a pretty agitated night.  Was up until 5 am, then fell asleep and just woke up now (around 11 am)    While patient was agitated she pushed her walker away from her and became unsteady on her feet.  The aide came to assist her and had to grab her wrist and help her to the floor for a \"controlled fall\".  Robyn says patient did not sustain any injuries, but she may have a bruise later on her wrist where the aide grabbed her (she does not have one now).    No need for a call back, unless there are further questions; Robyn just wanted to provide an update.      "

## 2017-09-12 ENCOUNTER — MEDICAL CORRESPONDENCE (OUTPATIENT)
Dept: HEALTH INFORMATION MANAGEMENT | Facility: CLINIC | Age: 82
End: 2017-09-12

## 2017-09-13 ENCOUNTER — OFFICE VISIT (OUTPATIENT)
Dept: FAMILY MEDICINE | Facility: CLINIC | Age: 82
End: 2017-09-13
Payer: MEDICARE

## 2017-09-13 VITALS
RESPIRATION RATE: 14 BRPM | TEMPERATURE: 97.7 F | BODY MASS INDEX: 32.93 KG/M2 | WEIGHT: 204 LBS | OXYGEN SATURATION: 95 % | DIASTOLIC BLOOD PRESSURE: 75 MMHG | SYSTOLIC BLOOD PRESSURE: 120 MMHG | HEART RATE: 70 BPM

## 2017-09-13 DIAGNOSIS — I35.0 SEVERE AORTIC STENOSIS: ICD-10-CM

## 2017-09-13 DIAGNOSIS — Z51.5 HOSPICE CARE PATIENT: ICD-10-CM

## 2017-09-13 DIAGNOSIS — F02.818 DEMENTIA DUE TO MEDICAL CONDITION WITH BEHAVIORAL DISTURBANCE (H): Primary | ICD-10-CM

## 2017-09-13 PROCEDURE — 99348 HOME/RES VST EST LOW MDM 30: CPT | Mod: GW | Performed by: NURSE PRACTITIONER

## 2017-09-13 NOTE — PROGRESS NOTES
SUBJECTIVE:                                                    Felicia Soriano is a 89 year old female who with a medical history significant for CKD stage 3, advanced dementia, Congestive heart failure, severe aortic stenosis, chronic knee pain is being followed by the complex care program and is seen in the home today for the following health issues:     All communication held between PCP and  ALYSSA Carlson. Patient is hospice enrolled.     Concern - Advanced dementia      Onset: 2 years ago moderate to severe    Description:   Story telling, memory issues, wondering,  Incontinence, irritability, behavioral issues.     Intensity: moderate    Progression of Symptoms:  She's been more resistant to cares and combative with staff. She refused her medications at times.     Accompanying Signs & Symptoms:  delirium and agitation, no changes in appetite, no cough or fever.      Previous history of similar problem: yes    Precipitating factors:   Worsened by: nothing    Alleviating factors:  Improved by: Seroquel       Therapies Tried and outcome:seroquel     Chronic Kidney Disease Follow-up      Current NSAID use?  No    Severe Aortic Stenosis  Denies chest pain, palpitations. Shortness of breath with minimal exertion. She's on oxygen 2L at night now. Sleeps most of the day. No syncopal episodes.     Problem list and histories reviewed & adjusted, as indicated.  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     Malignant neoplasm of breast (female) (H)     Vitamin D deficiency     Essential hypertension     Renal sclerosis     Edema     Advance Care Planning     CKD (chronic kidney disease) stage 3, GFR 30-59 ml/min     Heart failure with preserved ejection fraction (H)     Depression     Weakness     Excessive sleepiness     Flat affect     Infectious  encephalopathy     Benign hypertensive heart and CKD, 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    Social History   Substance Use Topics     Smoking status: Never Smoker     Smokeless tobacco: Never Used     Alcohol use No     Family History   Problem Relation Age of Onset     Parkinsonism Father      Alzheimer Disease Mother          Current Outpatient Prescriptions   Medication Sig Dispense Refill     QUEtiapine (SEROQUEL) 25 MG tablet TAKE 25MG (1 TABLET) BY MOUTH AT BEDTIME 30 tablet 0     potassium chloride (K-TAB,KLOR-CON) 10 MEQ tablet TAKE 10MEQ (1 TABLET) BY MOUTH EVERY DAY. 30 tablet 0     SENEXON-S 8.6-50 MG per tablet TAKE ONE TABLET BY MOUTH 2 TIMES A DAY AS NEEDED FOR CONSTIPATION 60 tablet 0     ALL DAY ALLERGY 10 MG tablet TAKE 10MG (1 TABLET) BY MOUTH EVERY DAY AS NEEDED FOR ALLERGIES 30 tablet 0     acetaminophen (TYLENOL) 325 MG tablet Take 2 tablets (650 mg) by mouth 2 times daily 100 tablet 3     order for DME Walker with wheels. 99 [employer]. 1 Device 0     potassium chloride SA (K-DUR/KLOR-CON M) 10 MEQ CR tablet TAKE 10MEQ (1 TABLET) BY MOUTH EVERY DAY 30 tablet 0     furosemide (LASIX) 20 MG tablet TAKE 20MG (1 TABLET) BY MOUTH EVERY DAY 90 tablet 3     traMADol (ULTRAM) 50 MG tablet Take 1 tablet (50 mg) by mouth every 6 hours as needed for moderate pain 10 tablet 0     Vitamin D, Cholecalciferol, 1000 UNITS TABS Take 2,000 Units by mouth daily 180 tablet 3     aspirin 81 MG EC tablet Take 1 tablet (81 mg) by mouth daily 30 tablet 11     docusate sodium (COLACE) 100 MG capsule Take 1 capsule (100 mg) by mouth daily 60 capsule 3     order for DME Equipment being ordered: chair lift   For severe arthritis of left knee 1 Device 0     order for DME Equipment being ordered: chair lift  1 Device 0     phenazopyridine (PYRIDIUM) 100 MG tablet Take 1 tablet (100 mg) by mouth 3 times daily as needed for urinary tract discomfort 12 tablet 0     order for DME Equipment being ordered: Fully Electronic Hospital Bed 1 Device 0     melatonin 1 MG TABS Take 2 mg by mouth nightly as needed for sleep       polyethylene glycol (MIRALAX/GLYCOLAX) powder Take 17 g by mouth daily as needed for constipation       order for DME Equipment being ordered: blood pressure kit , cuff size large 1 Device 0     albuterol (PROAIR HFA, PROVENTIL HFA, VENTOLIN HFA) 108 (90 BASE) MCG/ACT inhaler Inhale 2 puffs into the lungs every 6 hours as needed for shortness of breath / dyspnea or wheezing 1 Inhaler 0     Allergies   Allergen Reactions     Tape [Adhesive Tape] Rash     Paper tape     Recent Labs   Lab Test  12/30/16   1414  11/13/16   1900  03/24/16 03/13/16   1545   09/28/15   0904   A1C   --    --    --   5.4   --    --    --    --    ALT  13  14   --    --    --   18   --    --    CR  1.04  0.96   < >  0.84   < >  0.93   < >  0.94   GFRESTIMATED  50*  54*   < >  >60   < >  57*   < >  56*   GFRESTBLACK  60*  66   < >  >60   < >  69   < >  68   POTASSIUM  4.2  3.7   < >  4.1   < >  3.9   < >  3.7   TSH   --    --    --   4.05   --    --    --   2.09    < > = values in this interval not displayed.      BP Readings from Last 3 Encounters:   08/09/17 120/70   06/14/17 112/70   05/23/17 122/70    Wt Readings from Last 3 Encounters:   05/23/17 196 lb (88.9 kg)   03/30/16 223 lb 6.4 oz (101.3 kg)   03/28/16 218 lb 9.6 oz (99.2 kg)            Labs reviewed in EPIC  Problem list, Medication list, Allergies, and Medical/Social/Surgical histories reviewed in [facility] and updated as appropriate.    ROS:  Constitutional, HEENT, cardiovascular, pulmonary, GI, , musculoskeletal, neuro, skin, endocrine and psych systems are negative, except as otherwise noted.    OBJECTIVE:                                                    /75 (BP  Location: Right arm, Cuff Size: Adult Regular)  Pulse 70  Temp 97.7  F (36.5  C) (Oral)  Resp 14  Wt 204 lb (92.5 kg)  SpO2 95%  BMI 32.93 kg/m2  There is no height or weight on file to calculate BMI.  GENERAL: frail elderly, up in recliner chair and alert today.   EYES:  PERRL and conjunctivae and sclerae normal  RESP: lungs clear to auscultation - no rales, rhonchi or wheezes.   CV: Normal rate and rhythm. Systolic murmur 3/6 heard best over right sternal border.   ABDOMEN: soft, nontender, no hepatosplenomegaly, no masses and bowel sounds normal  MS: no gross musculoskeletal defects noted, no edema  SKIN: dry and warm  PSYCH: she is quiet and sitting in her lift chair.       Diagnostic Test Results:  none      ASSESSMENT/PLAN:                                                      1. 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  RN will monitor this. For now no medication changes.     2. Severe aortic stenosis  Severe, wearing oxygen at bedtime. Sleeps most of the day. Primary hospice Dx.     3. Hospice care patient      AHSAN Milan CNP  [facility]  30min spent in direct face to face time with this group home RN  greater than 50% in counseling  AS, dementia as stated above and coordination of care as stated above .

## 2017-09-13 NOTE — MR AVS SNAPSHOT
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 Please report any fever or cough or urinary symptoms to clinic.    We will administer your flu shot at your next scheduled visit.      Your next scheduled appointment is 10/11 at 11 am.          Follow-ups after your visit        Your next 10 appointments already scheduled     Oct 11, 2017 11:00 AM CDT   Return Visit with AHSAN Gerard CNP   [facility]    [address]  [address]  [facility] [address] [phone]            Nov 08, 2017 11:00 AM CST   Return Visit with AHSAN Gerard CNP   [facility] ([facility]    [address]  [address]  [facility] [id number] [phone]            Dec 13, 2017 11:00 AM CST   Return Visit with AHSAN Gerard CNP   [facility] ([facility]    [address]  [address]  [facility] [id number] [phone]              Who to contact     If you have questions or need follow up information about today's clinic visit or your schedule please contact [facility] directly at [phone].  Normal or non-critical lab and imaging results will be communicated to you by MyChart, letter or phone within 4 business days after the clinic has received the results. If you do not hear from us within 7 days, please contact the clinic through MyChart or phone. If you have a  critical or abnormal lab result, we will notify you by phone as soon as possible.  Submit refill requests through [employer] or call your pharmacy and they will forward the refill request to us. Please allow 3 business days for your refill to be completed.          Additional Information About Your Visit        [employer] Information     [employer] gives you secure access to your electronic health record. If you see a primary care provider, you can also send messages to your care team and make appointments. If you have questions, please call your primary care clinic.  If you do not have a primary care provider, please call [phone] and they will assist you.        Care EveryWhere ID     This is your Care EveryWhere ID. This could be used by other organizations to access your [place] medical records  [id number]        Your Vitals Were     Pulse Temperature Respirations Pulse Oximetry BMI (Body Mass Index)       70 97.7  F (36.5  C) (Oral) 14 95% 32.93 kg/m2        Blood Pressure from Last 3 Encounters:   09/13/17 120/75   08/09/17 120/70   06/14/17 112/70    Weight from Last 3 Encounters:   09/13/17 204 lb (92.5 kg)   05/23/17 196 lb (88.9 kg)   03/30/16 223 lb 6.4 oz (101.3 kg)              Today, you had the following     No orders found for display       Primary Care Provider Office Phone # Fax AHSAN Gruber [facility] [phone]       [address] [facility] [address]  [facility] [address]        Equal Access to Services     ARCELIA SUTTON : Hadii aad ku hadasho Soomaali, waaxda luqadaha, qaybta kaalmada adeegyada, waxay bernardain hayoscarn messi carias . So [facility] [phone].    ATENCIÓN: Si habla español, tiene a cruz disposición servicios gratuitos de asistencia lingüística. Llame al [phone].    We comply with applicable federal civil rights laws and [place] laws. We do not discriminate on the basis of race, color, national origin, age, disability sex, sexual orientation or gender  identity.            Thank you!     Thank you for choosing [facility]  for your care. Our goal is always to provide you with excellent care. Hearing back from our patients is one way we can continue to improve our services. Please take a few minutes to complete the written survey that you may receive in the mail after your visit with us. Thank you!             Your Updated Medication List - Protect others around you: Learn how to safely use, store and throw away your medicines at www.disposemymeds.org.          This list is accurate as of: 9/13/17 11:59 PM.  Always use your most recent med list.                   Brand Name Dispense Instructions for use Diagnosis    acetaminophen 325 MG tablet    TYLENOL    100 tablet    Take 2 tablets (650 mg) by mouth 2 times daily        albuterol 108 (90 BASE) MCG/ACT Inhaler    PROAIR HFA/PROVENTIL HFA/VENTOLIN HFA    1 Inhaler    Inhale 2 puffs into the lungs every 6 hours as needed for shortness of breath / dyspnea or wheezing    Cough, Wheezing       ALL DAY ALLERGY 10 MG tablet   Generic drug:  cetirizine     30 tablet    TAKE 10MG (1 TABLET) BY MOUTH EVERY DAY AS NEEDED FOR ALLERGIES    Seasonal allergic rhinitis due to pollen, unspecified chronicity       aspirin 81 MG EC tablet     30 tablet    Take 1 tablet (81 mg) by mouth daily    Aortic valve disorder       docusate sodium 100 MG capsule    COLACE    60 capsule    Take 1 capsule (100 mg) by mouth daily    Slow transit constipation       furosemide 20 MG tablet    LASIX    90 tablet    TAKE 20MG (1 TABLET) BY MOUTH EVERY DAY    Edema, unspecified type       melatonin 1 MG Tabs tablet      Take 2 mg by mouth nightly as needed for sleep        * order for DME     1 Device    Equipment being ordered: blood pressure kit , cuff size large    Essential hypertension       * order for DME     1 Device    Equipment being ordered: Fully Electronic Hospital Bed    Hospital discharge follow-up, Physical  deconditioning, Heart failure with preserved ejection fraction (H), Generalized edema       * order for DME     1 Device    Equipment being ordered: chair lift    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 [employer] [address].    Severe aortic stenosis, Arthritis of knee       phenazopyridine 100 MG tablet    PYRIDIUM    12 tablet    Take 1 tablet (100 mg) by mouth 3 times daily as needed for urinary tract discomfort    Dysuria, Personal history of urinary tract infection       polyethylene glycol powder    MIRALAX/GLYCOLAX     Take 17 g by mouth daily as needed for constipation        potassium chloride 10 MEQ tablet    K-TAB,KLOR-CON    30 tablet    TAKE 10MEQ (1 TABLET) BY MOUTH EVERY DAY.    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       SENEXON-S 8.6-50 MG per tablet   Generic drug:  senna-docusate     60 tablet    TAKE ONE TABLET BY MOUTH 2 TIMES A DAY AS NEEDED FOR CONSTIPATION    Constipation, unspecified constipation type       traMADol 50 MG tablet    ULTRAM    10 tablet    Take 1 tablet (50 mg) by mouth every 6 hours as needed for moderate pain    Other chronic pain, Pain of left lower extremity       Vitamin D (Cholecalciferol) 1000 UNITS Tabs     180 tablet    Take 2,000 Units by mouth daily    Vitamin D deficiency       * Notice:  This list has 5 medication(s) that are the same as other medications prescribed for you. Read the directions carefully, and ask your  doctor or other care provider to review them with you.

## 2017-09-13 NOTE — PATIENT INSTRUCTIONS
Today's visit:    Monitor behavioral symptoms. Please report any fever or cough or urinary symptoms to clinic.    We will administer your flu shot at your next scheduled visit.      Your next scheduled appointment is 10/11 at 11 am.

## 2017-09-13 NOTE — NURSING NOTE
"Chief Complaint   Patient presents with     RECHECK       Initial /75 (BP Location: Right arm, Cuff Size: Adult Regular)  Pulse 70  Temp 97.7  F (36.5  C) (Oral)  Resp 14  Wt 204 lb (92.5 kg)  SpO2 95%  BMI 32.93 kg/m2 Estimated body mass index is 32.93 kg/(m^2) as calculated from the following:    Height as of 3/30/16: 5' 6\" (1.676 m).    Weight as of this encounter: 204 lb (92.5 kg).  Medication Reconciliation: complete    "

## 2017-09-14 ENCOUNTER — MEDICAL CORRESPONDENCE (OUTPATIENT)
Dept: HEALTH INFORMATION MANAGEMENT | Facility: CLINIC | Age: 82
End: 2017-09-14

## 2017-09-26 NOTE — TELEPHONE ENCOUNTER
Call from Jose Guadalupe at [facility] stating their system is down and patient is requiring refills on meds.  Meds include   1.  Docusate sodium  #60-3 refills  2.  Senna S  #60-0 refills  3.  Potassium  #30-0 refills    Gave verbal ok to refill meds as requested per RN protocol.    Stephy Min RN

## 2017-10-04 ENCOUNTER — CARE COORDINATION (OUTPATIENT)
Dept: GERIATRIC MEDICINE | Facility: CLINIC | Age: 82
End: 2017-10-04

## 2017-10-04 NOTE — PROGRESS NOTES
Received voice mail message from Taya at [facility] at [phone] stating that edi denied coverage of Rolator walker as client was on hospice.  She asked for the date that client started hospice.  Called her back and let her know start date of hospice was 1/13/17 and that if waiver funds are needed for the purchase, she should let CM know and also give cost of it.  SANTHOSH Oneal, [facility]   Tel [phone]  Fax [phone]

## 2017-10-06 ENCOUNTER — TELEPHONE (OUTPATIENT)
Dept: FAMILY MEDICINE | Facility: CLINIC | Age: 82
End: 2017-10-06

## 2017-10-06 NOTE — TELEPHONE ENCOUNTER
Robyn called with [facility] for PCP regarding condition of patient. Patient had an unresponsive episode yesterday that lasted most of the day.. She did not eat or drink all day, Hospice came and gave her pain meds. Today she wont get out of bed and has been mostly unresponsive today as well. She did take a little bit of water today.     FWD to PCP

## 2017-10-11 ENCOUNTER — OFFICE VISIT (OUTPATIENT)
Dept: FAMILY MEDICINE | Facility: CLINIC | Age: 82
End: 2017-10-11

## 2017-10-11 VITALS
TEMPERATURE: 96 F | RESPIRATION RATE: 14 BRPM | HEART RATE: 74 BPM | OXYGEN SATURATION: 96 % | DIASTOLIC BLOOD PRESSURE: 62 MMHG | SYSTOLIC BLOOD PRESSURE: 102 MMHG

## 2017-10-11 DIAGNOSIS — F02.818 DEMENTIA DUE TO MEDICAL CONDITION WITH BEHAVIORAL DISTURBANCE (H): Primary | ICD-10-CM

## 2017-10-11 DIAGNOSIS — Z23 NEED FOR PROPHYLACTIC VACCINATION AND INOCULATION AGAINST INFLUENZA: ICD-10-CM

## 2017-10-11 DIAGNOSIS — I35.0 SEVERE AORTIC STENOSIS: ICD-10-CM

## 2017-10-11 PROCEDURE — G0008 ADMIN INFLUENZA VIRUS VAC: HCPCS | Mod: GW | Performed by: NURSE PRACTITIONER

## 2017-10-11 PROCEDURE — 90662 IIV NO PRSV INCREASED AG IM: CPT | Mod: GW | Performed by: NURSE PRACTITIONER

## 2017-10-11 PROCEDURE — 99349 HOME/RES VST EST MOD MDM 40: CPT | Mod: 25 | Performed by: NURSE PRACTITIONER

## 2017-10-11 NOTE — MR AVS SNAPSHOT
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           Follow-ups after your visit        Your next 10 appointments already scheduled     Oct 31, 2017 12:15 PM CDT   Return Visit with AHSAN Gerard CNP   [facility] ([facility]    [address]  [address]  [facility] [address] [phone]            Dec 13, 2017 11:00 AM CST   Return Visit with AHSAN Gerard CNP   [facility] ([facility])    [address]  [address]  [facility] [id number] [phone]              Who to contact     If you have questions or need follow up information about today's clinic visit or your schedule please contact [facility] directly at [phone].  Normal or non-critical lab and imaging results will be communicated to you by MyChart, letter or phone within 4 business days after the clinic has received the results. If you do not hear from us within 7 days, please contact the clinic through [employer] or phone. If you have a critical or abnormal lab result, we will notify you by phone as soon as possible.  Submit refill requests through [employer] or call your pharmacy and they will forward the refill request to us. Please allow 3 business days for your refill to be completed.          Additional Information About Your Visit        MyChart Information     [employer] gives  you secure access to your electronic health record. If you see a primary care provider, you can also send messages to your care team and make appointments. If you have questions, please call your primary care clinic.  If you do not have a primary care provider, please call [phone] and they will assist you.        Care EveryWhere ID     This is your Care EveryWhere ID. This could be used by other organizations to access your [place] medical records  [id number]        Your Vitals Were     Pulse Temperature Respirations Pulse Oximetry          74 96  F (35.6  C) (Temporal) 14 96%         Blood Pressure from Last 3 Encounters:   10/11/17 102/62   09/13/17 120/75   08/09/17 120/70    Weight from Last 3 Encounters:   09/13/17 204 lb (92.5 kg)   05/23/17 196 lb (88.9 kg)   03/30/16 223 lb 6.4 oz (101.3 kg)              We Performed the Following     FLU VACCINE, 3 YRS +, IM (FLUZONE)     HC FLU VACCINE, INCREASED ANTIGEN, PRESV FREE     VACCINE ADMINISTRATION, INITIAL        Primary Care Provider Office Phone # Fax #    Shahrzad AHSAN Collins [phone]       [address] [facility] [address]  [facility] [address]        Equal Access to Services     ARCELIA SUTTON : Hadii aad ku hadasho Soomaali, waaxda luqadaha, qaybta kaalmada adeegyada, waxay bernardain hayoscarn messi carias . So [facility] [phone].    ATENCIÓN: Si habla español, tiene a cruz disposición servicios gratuitos de asistencia lingüística. Llame al [phone].    We comply with applicable federal civil rights laws and [place] laws. We do not discriminate on the basis of race, color, national origin, age, disability, sex, sexual orientation, or gender identity.            Thank you!     Thank you for choosing [facility]  for your care. Our goal is always to provide you with excellent care. Hearing back from our patients is one way we can continue to improve our services. Please take a few minutes to complete the  written survey that you may receive in the mail after your visit with us. Thank you!             Your Updated Medication List - Protect others around you: Learn how to safely use, store and throw away your medicines at www.disposemymeds.org.          This list is accurate as of: 10/11/17 11:59 PM.  Always use your most recent med list.                   Brand Name Dispense Instructions for use Diagnosis    acetaminophen 325 MG tablet    TYLENOL    100 tablet    Take 2 tablets (650 mg) by mouth 2 times daily        albuterol 108 (90 BASE) MCG/ACT Inhaler    PROAIR HFA/PROVENTIL HFA/VENTOLIN HFA    1 Inhaler    Inhale 2 puffs into the lungs every 6 hours as needed for shortness of breath / dyspnea or wheezing    Cough, Wheezing       ALL DAY ALLERGY 10 MG tablet   Generic drug:  cetirizine     30 tablet    TAKE 10MG (1 TABLET) BY MOUTH EVERY DAY AS NEEDED FOR ALLERGIES    Seasonal allergic rhinitis due to pollen, unspecified chronicity       aspirin 81 MG EC tablet     30 tablet    Take 1 tablet (81 mg) by mouth daily    Aortic valve disorder       docusate sodium 100 MG capsule    COLACE    60 capsule    Take 1 capsule (100 mg) by mouth daily    Slow transit constipation       furosemide 20 MG tablet    LASIX    90 tablet    TAKE 20MG (1 TABLET) BY MOUTH EVERY DAY    Edema, unspecified type       melatonin 1 MG Tabs tablet      Take 2 mg by mouth nightly as needed for sleep        * order for DME     1 Device    Equipment being ordered: blood pressure kit , cuff size large    Essential hypertension       * order for DME     1 Device    Equipment being ordered: Fully Electronic Hospital Bed    Hospital discharge follow-up, Physical deconditioning, Heart failure with preserved ejection fraction (H), Generalized edema       * order for DME     1 Device    Equipment being ordered: chair lift    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 [employer] [address].    Severe aortic stenosis, Arthritis of knee       phenazopyridine 100 MG tablet    PYRIDIUM    12 tablet    Take 1 tablet (100 mg) by mouth 3 times daily as needed for urinary tract discomfort    Dysuria, Personal history of urinary tract infection       polyethylene glycol powder    MIRALAX/GLYCOLAX     Take 17 g by mouth daily as needed for constipation        potassium chloride 10 MEQ tablet    K-TAB,KLOR-CON    30 tablet    TAKE 10MEQ (1 TABLET) BY MOUTH EVERY DAY.    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       SENEXON-S 8.6-50 MG per tablet   Generic drug:  senna-docusate     60 tablet    TAKE ONE TABLET BY MOUTH 2 TIMES A DAY AS NEEDED FOR CONSTIPATION    Constipation, unspecified constipation type       traMADol 50 MG tablet    ULTRAM    10 tablet    Take 1 tablet (50 mg) by mouth every 6 hours as needed for moderate pain    Other chronic pain, Pain of left lower extremity       Vitamin D (Cholecalciferol) 1000 UNITS Tabs     180 tablet    Take 2,000 Units by mouth daily    Vitamin D deficiency       * Notice:  This list has 5 medication(s) that are the same as other medications prescribed for you. Read the directions carefully, and ask your doctor or other care provider to review them with you.

## 2017-10-11 NOTE — PROGRESS NOTES
SUBJECTIVE:                                                    Felicia Soriano is a 89 year old female who with a medical history significant for CKD stage 3, advanced dementia, Congestive heart failure, severe aortic stenosis, chronic knee pain and is now enrolled in hospice care is being followed by the complex care program and is seen in the home today for the following health issues:     All communication held between PCP and  ALYSSA Carlson. Patient is hospice enrolled.  She had an episode last week where she was unresponsive and had left sided weakness that lasted 24 hrs. Today she is sleeping throughout most of the encounter. She is now on oxygen 2L.      Concern - Advanced dementia      Onset: 2 years ago moderate to severe    Description:   Story telling, memory issues, wondering,  Incontinence, irritability, behavioral issues.     Intensity: moderate    Progression of Symptoms:  She's been more resistant to cares and combative with staff. She refused her medications at times.     Accompanying Signs & Symptoms:  delirium and agitation, no changes in appetite, no cough or fever.      Previous history of similar problem: yes    Precipitating factors:   Worsened by: nothing    Alleviating factors:  Improved by: Seroquel       Therapies Tried and outcome:seroquel     Chronic Kidney Disease Follow-up      Current NSAID use?  No    Severe Aortic Stenosis  Denies chest pain, palpitations. Shortness of breath with minimal exertion. She's on oxygen 2L oxygen during the day. Sleeps most of the day.     Problem list and histories reviewed & adjusted, as indicated.  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     Malignant neoplasm of breast (female) (H)     Vitamin D deficiency     Essential hypertension     Renal sclerosis     Edema     Advance Care  Planning     CKD (chronic kidney disease) stage 3, GFR 30-59 ml/min     Heart failure with preserved ejection fraction (H)     Depression     Weakness     Excessive sleepiness     Flat affect     Infectious encephalopathy     Benign hypertensive heart and CKD, 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    Social History   Substance Use Topics     Smoking status: Never Smoker     Smokeless tobacco: Never Used     Alcohol use No     Family History   Problem Relation Age of Onset     Parkinsonism Father      Alzheimer Disease Mother          Current Outpatient Prescriptions   Medication Sig Dispense Refill     QUEtiapine (SEROQUEL) 25 MG tablet TAKE 25MG (1 TABLET) BY MOUTH AT BEDTIME 30 tablet 0     potassium chloride (K-TAB,KLOR-CON) 10 MEQ tablet TAKE 10MEQ (1 TABLET) BY MOUTH EVERY DAY. 30 tablet 0     SENEXON-S 8.6-50 MG per tablet TAKE ONE TABLET BY MOUTH 2 TIMES A DAY AS NEEDED FOR CONSTIPATION 60 tablet 0     ALL DAY ALLERGY 10 MG tablet TAKE 10MG (1 TABLET) BY MOUTH EVERY DAY AS NEEDED FOR ALLERGIES 30 tablet 0     acetaminophen (TYLENOL) 325 MG tablet Take 2 tablets (650 mg) by mouth 2 times daily 100 tablet 3     order for DME Walker with wheels. 99 Lifetime. 1 Device 0     potassium chloride SA (K-DUR/KLOR-CON M) 10 MEQ CR tablet TAKE 10MEQ (1 TABLET) BY MOUTH EVERY DAY 30 tablet 0     furosemide (LASIX) 20 MG tablet TAKE 20MG (1 TABLET) BY MOUTH EVERY DAY 90 tablet 3     traMADol (ULTRAM) 50 MG tablet Take 1 tablet (50 mg) by mouth every 6 hours as needed for moderate pain 10 tablet 0     Vitamin D, Cholecalciferol, 1000 UNITS TABS Take 2,000 Units by mouth daily 180 tablet 3     aspirin 81 MG EC tablet Take 1 tablet (81 mg) by mouth daily 30 tablet 11     docusate sodium (COLACE) 100 MG capsule  Take 1 capsule (100 mg) by mouth daily 60 capsule 3     order for DME Equipment being ordered: chair lift   For severe arthritis of left knee 1 Device 0     order for DME Equipment being ordered: chair lift 1 Device 0     phenazopyridine (PYRIDIUM) 100 MG tablet Take 1 tablet (100 mg) by mouth 3 times daily as needed for urinary tract discomfort 12 tablet 0     order for DME Equipment being ordered: Fully Electronic Hospital Bed 1 Device 0     melatonin 1 MG TABS Take 2 mg by mouth nightly as needed for sleep       polyethylene glycol (MIRALAX/GLYCOLAX) powder Take 17 g by mouth daily as needed for constipation       order for DME Equipment being ordered: blood pressure kit , cuff size large 1 Device 0     albuterol (PROAIR HFA, PROVENTIL HFA, VENTOLIN HFA) 108 (90 BASE) MCG/ACT inhaler Inhale 2 puffs into the lungs every 6 hours as needed for shortness of breath / dyspnea or wheezing 1 Inhaler 0     Allergies   Allergen Reactions     Tape [Adhesive Tape] Rash     Paper tape     Recent Labs   Lab Test  12/30/16   1414  11/13/16   1900  03/24/16 03/13/16   1545   09/28/15   0904   A1C   --    --    --   5.4   --    --    --    --    ALT  13  14   --    --    --   18   --    --    CR  1.04  0.96   < >  0.84   < >  0.93   < >  0.94   GFRESTIMATED  50*  54*   < >  >60   < >  57*   < >  56*   GFRESTBLACK  60*  66   < >  >60   < >  69   < >  68   POTASSIUM  4.2  3.7   < >  4.1   < >  3.9   < >  3.7   TSH   --    --    --   4.05   --    --    --   2.09    < > = values in this interval not displayed.      BP Readings from Last 3 Encounters:   09/13/17 120/75   08/09/17 120/70   06/14/17 112/70    Wt Readings from Last 3 Encounters:   09/13/17 204 lb (92.5 kg)   05/23/17 196 lb (88.9 kg)   03/30/16 223 lb 6.4 oz (101.3 kg)            Labs reviewed in EPIC  Problem list, Medication list, Allergies, and Medical/Social/Surgical histories reviewed in [facility] and updated as appropriate.    ROS:  Constitutional, HEENT,  cardiovascular, pulmonary, GI, , musculoskeletal, neuro, skin, endocrine and psych systems are negative, except as otherwise noted.    OBJECTIVE:                                                    There were no vitals taken for this visit.  There is no height or weight on file to calculate BMI.  GENERAL: frail elderly, up in recliner chair and alert today.   EYES:  PERRL and conjunctivae and sclerae normal  RESP: lungs clear to auscultation - no rales, rhonchi or wheezes.   CV: Normal rate and rhythm. Systolic murmur 3/6 heard best over right sternal border.   ABDOMEN: soft, nontender, no hepatosplenomegaly, no masses and bowel sounds normal  MS: no gross musculoskeletal defects noted, no edema  SKIN: dry and warm  PSYCH: she is quiet and sitting in her lift chair.       Diagnostic Test Results:  none      ASSESSMENT/PLAN:                                                      1. 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 Her unresponsive episode and left sided weakness last week that lasted 24 hrs could likely represent a TIA among others. Discussed patient's poor prognosis with daughter at bedside today. She is currently enrolled in hospice this is a very good thing.     2. Severe aortic stenosis  Is now on oxygen 2 L    3. Need for prophylactic vaccination and inoculation against influenza    - FLU VACCINE, 3 YRS +, IM (FLUZONE)  - VACCINE ADMINISTRATION, INITIAL\      [facility]  50 min spent in direct face to face time with this patient and daughter and GH RN in the home, greater than 50% in counseling end of life, as stated above and coordination of care as stated above.

## 2017-10-11 NOTE — NURSING NOTE
"Chief Complaint   Patient presents with     RECHECK       Initial /62 (BP Location: Right arm, Cuff Size: Adult Regular)  Pulse 74  Temp 96  F (35.6  C) (Temporal)  Resp 14  SpO2 96% Estimated body mass index is 32.93 kg/(m^2) as calculated from the following:    Height as of 3/30/16: 5' 6\" (1.676 m).    Weight as of 9/13/17: 204 lb (92.5 kg).  Medication Reconciliation: complete    "

## 2017-10-13 ENCOUNTER — CARE COORDINATION (OUTPATIENT)
Dept: GERIATRIC MEDICINE | Facility: CLINIC | Age: 82
End: 2017-10-13

## 2017-10-13 NOTE — PROGRESS NOTES
Left message for Robyn SHAH/owner of group home requesting return call to complete six month telephone assessment.  SANTHOSH Oneal, [facility]   Tel [phone]  Fax [phone]

## 2017-10-16 NOTE — PROGRESS NOTES
[facility] Six-Month Telephone Assessment    6 month telephone assessment completed on 10/16/17.    ER visits: No  Hospitalizations: No  TCU stays: No  Significant health status changes: Client remains on hospice.  She had one recent unresponsive episode that lasted approximately a day.  She is doing better now and is almost back to her baseline.  Falls/Injuries: No  ADL/IADL changes: No  Changes in services: No    Goals: See POC in chart for goal progress documentation.     Caregiver Assessment follow up:  na    Will see client in 6 months for an annual health risk assessment.   Encouraged client to call CM with any questions or concerns in the meantime.     SANTHOSH Oneal, CCM  [facility]   Tel [phone]  Fax [phone]

## 2017-10-18 ENCOUNTER — TELEPHONE (OUTPATIENT)
Dept: FAMILY MEDICINE | Facility: CLINIC | Age: 82
End: 2017-10-18

## 2017-10-18 NOTE — TELEPHONE ENCOUNTER
Received call from Robyn RN at [facility] stating that the Pt has been wheezing last night and today and has a congested cough. One of their residents and a residents family member had pneumonia last week and are concerned that patient has it. Robyn is not at the home so she's not aware of lung sounds or vitals. She stated that she would call their and obtain that info while waiting for a call back from the care team.     Robyn -- [phone] - ok to leave message    Tess Shelton  [facility]

## 2017-10-18 NOTE — TELEPHONE ENCOUNTER
Spoke with ALYSSA Carlson at  who states the hospice provider already gave orders for patient.    1.  Robitussin 100/5 ml 10 ml q4h while awake x 5 days  2.  Albuterol neb q6h PRN  3.  Zpak that will start tomorrow per family request.    Routing to provider as FYRYLIE who is scheduled to see patient on 10/31/17.    Stephy Min RN

## 2017-10-31 ENCOUNTER — OFFICE VISIT (OUTPATIENT)
Dept: FAMILY MEDICINE | Facility: CLINIC | Age: 82
End: 2017-10-31

## 2017-10-31 DIAGNOSIS — R39.9 UTI SYMPTOMS: ICD-10-CM

## 2017-10-31 DIAGNOSIS — F02.818 DEMENTIA DUE TO MEDICAL CONDITION WITH BEHAVIORAL DISTURBANCE (H): Primary | ICD-10-CM

## 2017-10-31 DIAGNOSIS — I35.0 SEVERE AORTIC STENOSIS: ICD-10-CM

## 2017-10-31 PROCEDURE — 99350 HOME/RES VST EST HIGH MDM 60: CPT | Mod: GW | Performed by: NURSE PRACTITIONER

## 2017-10-31 NOTE — MR AVS SNAPSHOT
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           Follow-ups after your visit        Who to contact     If you have questions or need follow up information about today's clinic visit or your schedule please contact [facility] directly at [phone].  Normal or non-critical lab and imaging results will be communicated to you by MyChart, letter or phone within 4 business days after the clinic has received the results. If you do not hear from us within 7 days, please contact the clinic through "[employer]hart or phone. If you have a critical or abnormal lab result, we will notify you by phone as soon as possible.  Submit refill requests through [employer] or call your pharmacy and they will forward the refill request to us. Please allow 3 business days for your refill to be completed.          Additional Information About Your Visit        MyChart Information     [employer] gives you secure access to your electronic health record. If you see a primary care provider, you can also send messages to your care team and make appointments. If you have questions, please call your primary care clinic.  If you do not have a primary care provider, please call [phone] and they will assist you.        Care EveryWhere ID     This is your Care EveryWhere ID. This could be used by other organizations to access your [place] medical records  [id number]         Blood Pressure from Last 3 Encounters:   10/11/17 102/62   09/13/17 120/75   08/09/17 120/70     Weight from Last 3 Encounters:   09/13/17 204 lb (92.5 kg)   05/23/17 196 lb (88.9 kg)   03/30/16 223 lb 6.4 oz (101.3 kg)              Today, you had the following     No orders found for display       Primary Care Provider Office Phone # Fax [facility] [phone]       [address] [facility] [address]  [facility] [address]        Equal Access to Services     ARCELIA SUTTON : Hadii aad ku hadasho Soomaali, waaxda luqadaha, qaybta kaalmada adeegyada, waxay idiin hayaan adeeg kharash la'diaz . So [facility] [phone].    ATENCIÓN: Si habla español, tiene a cruz disposición servicios gratuitos de asistencia lingüística. [facility] [phone].    We comply with applicable federal civil rights laws and [place] laws. We do not discriminate on the basis of race, color, national origin, age, disability, sex, sexual orientation, or gender identity.            Thank you!     Thank you for choosing [facility]  for your care. Our goal is always to provide you with excellent care. Hearing back from our patients is one way we can continue to improve our services. Please take a few minutes to complete the written survey that you may receive in the mail after your visit with us. Thank you!             Your Updated Medication List - Protect others around you: Learn how to safely use, store and throw away your medicines at www.disposemymeds.org.          This list is accurate as of: 10/31/17 11:59 PM.  Always use your most recent med list.                   Brand Name Dispense Instructions for use Diagnosis    acetaminophen 325 MG tablet    TYLENOL    100 tablet    Take 2 tablets (650 mg) by mouth 2 times daily        albuterol 108 (90 BASE) MCG/ACT Inhaler    PROAIR HFA/PROVENTIL HFA/VENTOLIN HFA    1 Inhaler    Inhale 2 puffs into the lungs every 6 hours as needed for shortness of breath / dyspnea or wheezing    Cough, Wheezing       ALL DAY ALLERGY 10 MG tablet   Generic drug:   cetirizine     30 tablet    TAKE 10MG (1 TABLET) BY MOUTH EVERY DAY AS NEEDED FOR ALLERGIES    Seasonal allergic rhinitis due to pollen, unspecified chronicity       aspirin 81 MG EC tablet     30 tablet    Take 1 tablet (81 mg) by mouth daily    Aortic valve disorder       docusate sodium 100 MG capsule    COLACE    60 capsule    Take 1 capsule (100 mg) by mouth daily    Slow transit constipation       furosemide 20 MG tablet    LASIX    90 tablet    TAKE 20MG (1 TABLET) BY MOUTH EVERY DAY    Edema, unspecified type       melatonin 1 MG Tabs tablet      Take 2 mg by mouth nightly as needed for sleep        * order for DME     1 Device    Equipment being ordered: blood pressure kit , cuff size large    Essential hypertension       * order for DME     1 Device    Equipment being ordered: Fully Electronic Hospital Bed    Hospital discharge follow-up, Physical deconditioning, Heart failure with preserved ejection fraction (H), Generalized edema       * order for DME     1 Device    Equipment being ordered: chair lift    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 99 Lifetime.    Severe aortic stenosis, Arthritis of knee       phenazopyridine 100 MG tablet    PYRIDIUM    12 tablet    Take 1 tablet (100 mg) by mouth 3 times daily as needed for urinary tract discomfort    Dysuria, Personal history of urinary tract infection       polyethylene glycol powder    MIRALAX/GLYCOLAX     Take 17 g by mouth daily as needed for constipation        potassium chloride 10 MEQ tablet    K-TAB,KLOR-CON    30 tablet    TAKE 10MEQ (1 TABLET) BY MOUTH EVERY DAY.    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       SENEXON-S 8.6-50 MG per tablet   Generic drug:  senna-docusate     60 tablet    TAKE ONE TABLET BY MOUTH 2 TIMES A DAY AS NEEDED FOR CONSTIPATION    Constipation, unspecified constipation type       traMADol 50 MG tablet    ULTRAM    10 tablet    Take 1 tablet (50 mg) by mouth every 6 hours as needed for moderate pain    Other chronic pain, Pain of left lower extremity       Vitamin D (Cholecalciferol) 1000 UNITS Tabs     180 tablet    Take 2,000 Units by mouth daily    Vitamin D deficiency       * Notice:  This list has 5 medication(s) that are the same as other medications prescribed for you. Read the directions carefully, and ask your doctor or other care provider to review them with you.

## 2017-10-31 NOTE — Clinical Note
AA- hospice enrolled. Last apt today  Noman- pls call hospice RN to discuss starting ABX for pt given she is experiencing UTI symptoms and now pelvic pain has started. See note for full details. I don't want to step on their toes given they will take over care for pt. Thanks!

## 2017-10-31 NOTE — PROGRESS NOTES
SUBJECTIVE:                                                    Felicia Soriano is a 89 year old female who with a medical history significant for CKD stage 3, advanced dementia, Congestive heart failure, severe aortic stenosis, chronic knee pain and is now enrolled in hospice care is being followed by the complex care program and is seen in the home today for the following health issues:     We informed patient, daughter, and  RN today that [facility] program is closing at the end of December. All primary care will be received by hospice service moving forward.     All communication held between PCP and  RN Robyn. Patient is hospice enrolled.  She had another  episode this week where she was unresponsive on the toilet, she started to drool, and her whole body slumped over. The main concern today is she has foul smelling urine and daughter and RN believe it's a UTI. They said they contacted hospice and given she is not having any pain they will not treat her see below.She is alert sitting up in a recliner. She is now on oxygen 2L.    Urinary symptoms  Duration of complaint: few days. Foul smelling urine. Concentrated.  RN called hospice to discuss pt's symptoms and they report that since pt is without pain from symptoms they will not treat her with ABX. Unknown if pt is having any fevers as pt has scheduled APAP. Today pt c/o of lower quadrant pain.     Concern - Advanced dementia      Onset: 2 years ago moderate to severe    Description:   Story telling, memory issues, wondering,  Incontinence, irritability, behavioral issues.     Intensity: moderate    Progression of Symptoms:  She's been more resistant to cares and combative with staff. She refused her medications at times.     Accompanying Signs & Symptoms:  delirium and agitation, no changes in appetite, no cough or fever.      Previous history of similar problem: yes    Precipitating factors:   Worsened by: nothing    Alleviating factors:  Improved by: Seroquel        Therapies Tried and outcome:seroquel     Chronic Kidney Disease Follow-up      Current NSAID use?  No    Severe Aortic Stenosis  Denies chest pain, palpitations. Shortness of breath with minimal exertion. She's on oxygen 2L oxygen during the day. Sleeps most of the day.     Problem list and histories reviewed & adjusted, as indicated.  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     Malignant neoplasm of breast (female) (H)     Vitamin D deficiency     Essential hypertension     Renal sclerosis     Edema     Advance Care Planning     CKD (chronic kidney disease) stage 3, GFR 30-59 ml/min     Heart failure with preserved ejection fraction (H)     Depression     Weakness     Excessive sleepiness     Flat affect     Infectious encephalopathy     Benign hypertensive heart and CKD, 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    Social History   Substance Use Topics     Smoking status: Never Smoker     Smokeless tobacco: Never Used     Alcohol use No     Family History   Problem Relation Age of Onset     Parkinsonism Father      Alzheimer Disease Mother          Current Outpatient Prescriptions   Medication Sig Dispense Refill     QUEtiapine (SEROQUEL) 25 MG tablet TAKE 25MG (1 TABLET) BY MOUTH AT BEDTIME 30 tablet 0     potassium chloride (K-TAB,KLOR-CON) 10 MEQ tablet TAKE 10MEQ (1 TABLET) BY MOUTH EVERY DAY. 30 tablet 0     SENEXON-S 8.6-50 MG per tablet TAKE ONE TABLET BY MOUTH 2 TIMES A DAY AS NEEDED FOR CONSTIPATION 60 tablet 0     ALL DAY ALLERGY 10 MG tablet TAKE 10MG (1 TABLET) BY MOUTH EVERY DAY AS NEEDED FOR ALLERGIES 30 tablet 0      acetaminophen (TYLENOL) 325 MG tablet Take 2 tablets (650 mg) by mouth 2 times daily 100 tablet 3     order for DME Walker with wheels. 99 Lifetime. 1 Device 0     potassium chloride SA (K-DUR/KLOR-CON M) 10 MEQ CR tablet TAKE 10MEQ (1 TABLET) BY MOUTH EVERY DAY 30 tablet 0     furosemide (LASIX) 20 MG tablet TAKE 20MG (1 TABLET) BY MOUTH EVERY DAY 90 tablet 3     traMADol (ULTRAM) 50 MG tablet Take 1 tablet (50 mg) by mouth every 6 hours as needed for moderate pain 10 tablet 0     Vitamin D, Cholecalciferol, 1000 UNITS TABS Take 2,000 Units by mouth daily 180 tablet 3     aspirin 81 MG EC tablet Take 1 tablet (81 mg) by mouth daily 30 tablet 11     docusate sodium (COLACE) 100 MG capsule Take 1 capsule (100 mg) by mouth daily 60 capsule 3     order for DME Equipment being ordered: chair lift   For severe arthritis of left knee 1 Device 0     order for DME Equipment being ordered: chair lift 1 Device 0     phenazopyridine (PYRIDIUM) 100 MG tablet Take 1 tablet (100 mg) by mouth 3 times daily as needed for urinary tract discomfort 12 tablet 0     order for DME Equipment being ordered: Fully Electronic Hospital Bed 1 Device 0     melatonin 1 MG TABS Take 2 mg by mouth nightly as needed for sleep       polyethylene glycol (MIRALAX/GLYCOLAX) powder Take 17 g by mouth daily as needed for constipation       order for DME Equipment being ordered: blood pressure kit , cuff size large 1 Device 0     albuterol (PROAIR HFA, PROVENTIL HFA, VENTOLIN HFA) 108 (90 BASE) MCG/ACT inhaler Inhale 2 puffs into the lungs every 6 hours as needed for shortness of breath / dyspnea or wheezing 1 Inhaler 0     Allergies   Allergen Reactions     Tape [Adhesive Tape] Rash     Paper tape     Recent Labs   Lab Test  12/30/16   1414  11/13/16   1900  03/24/16 03/13/16   1545   09/28/15   0904   A1C   --    --    --   5.4   --    --    --    --    ALT  13  14   --    --    --   18   --    --    CR  1.04  0.96   < >  0.84   < >  0.93   < >  0.94    GFRESTIMATED  50*  54*   < >  >60   < >  57*   < >  56*   GFRESTBLACK  60*  66   < >  >60   < >  69   < >  68   POTASSIUM  4.2  3.7   < >  4.1   < >  3.9   < >  3.7   TSH   --    --    --   4.05   --    --    --   2.09    < > = values in this interval not displayed.      BP Readings from Last 3 Encounters:   10/11/17 102/62   09/13/17 120/75   08/09/17 120/70    Wt Readings from Last 3 Encounters:   09/13/17 204 lb (92.5 kg)   05/23/17 196 lb (88.9 kg)   03/30/16 223 lb 6.4 oz (101.3 kg)            Labs reviewed in EPIC  Problem list, Medication list, Allergies, and Medical/Social/Surgical histories reviewed in [facility] and updated as appropriate.    ROS:  Constitutional, HEENT, cardiovascular, pulmonary, GI, , musculoskeletal, neuro, skin, endocrine and psych systems are negative, except as otherwise noted.    OBJECTIVE:                                                    There were no vitals taken for this visit.  There is no height or weight on file to calculate BMI.  GENERAL: frail elderly, up in recliner chair and alert today.   EYES:  PERRL and conjunctivae and sclerae normal  RESP: lungs clear to auscultation - no rales, rhonchi or wheezes.   CV: Normal rate and rhythm. Systolic murmur 3/6 heard best over right sternal border.   ABDOMEN: soft, nontender, no hepatosplenomegaly, no masses and bowel sounds normal  MS: no gross musculoskeletal defects noted, no edema  SKIN: dry and warm  PSYCH: she is quiet and sitting in her lift chair.       Diagnostic Test Results:  none      ASSESSMENT/PLAN:                                                        1. 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     2. Severe aortic stenosis  Pt had another syncopal/unresponsive episode that lasted minutes. Full return to baseline function. Eating and hydrating well.     3. UTI symptoms  New issue. Reports foul smelling urine and new onset of pelvic pain. Patient is followed by  hospice. Apparently [facility] RN called hospice requesting ABX but since pt was not experiencing any pain or discomfort they opted not to treat with ABX. Today pt is reporting lower abdominal pain. Advised RN to call hospice again. Will also discuss with CCC RN.        AHSAN Milan [facility] CARE [facility]

## 2017-11-01 ENCOUNTER — TELEPHONE (OUTPATIENT)
Dept: FAMILY MEDICINE | Facility: CLINIC | Age: 82
End: 2017-11-01

## 2017-11-01 NOTE — TELEPHONE ENCOUNTER
Received message from provider regarding possible UTI symptoms but patient is now under hospice team.    Left voicemail for ALYSSA Carlson [facility] to contact hospice team with any symptoms of UTI.    Stephy Min RN

## 2017-11-09 DIAGNOSIS — E87.6 HYPOKALEMIA: ICD-10-CM

## 2017-11-09 DIAGNOSIS — F02.818 DEMENTIA DUE TO MEDICAL CONDITION WITH BEHAVIORAL DISTURBANCE (H): ICD-10-CM

## 2017-11-09 RX ORDER — QUETIAPINE FUMARATE 25 MG/1
TABLET, FILM COATED ORAL
Qty: 30 TABLET | Refills: 11 | Status: SHIPPED | OUTPATIENT
Start: 2017-11-09

## 2017-11-09 RX ORDER — POTASSIUM CHLORIDE 750 MG/1
TABLET, EXTENDED RELEASE ORAL
Qty: 30 TABLET | Refills: 11 | Status: SHIPPED | OUTPATIENT
Start: 2017-11-09

## 2017-11-09 NOTE — TELEPHONE ENCOUNTER
Potassium Chloride SA 10 Meq      Last Written Prescription Date: 8/14/17  Last Fill Quantity: 30, # refills: 0  [place] [facility] prescribing provider: 10/31/17       Potassium   Date Value Ref Range Status   12/30/2016 4.2 3.4 - 5.3 mmol/L Final     Creatinine   Date Value Ref Range Status   12/30/2016 1.04 0.52 - 1.04 mg/dL Final     BP Readings from Last 3 Encounters:   10/11/17 102/62   09/13/17 120/75   08/09/17 120/70     Prescription approved per [facility] Refill Protocol.      Quetiapine 25 mg     Last Written Prescription Date: 8/25/17  Last Fill Quantity: 30, # refills: 0  [place] [facility] or [facility] prescribing provider: 10/31/17       BP Readings from Last 3 Encounters:   10/11/17 102/62   09/13/17 120/75   08/09/17 120/70     Pulse Readings from Last 2 Encounters:   10/11/17 74   09/13/17 70     Lab Results   Component Value Date    GLC 88 12/30/2016     Lab Results   Component Value Date    WBC 6.2 11/13/2016     Lab Results   Component Value Date    RBC 5.16 11/13/2016     Lab Results   Component Value Date    HGB 15.1 11/13/2016     Lab Results   Component Value Date    HCT 48.1 11/13/2016     No components found for: MCT  Lab Results   Component Value Date    MCV 93 11/13/2016     Lab Results   Component Value Date    MCH 29.3 11/13/2016     Lab Results   Component Value Date    MCHC 31.4 11/13/2016     Lab Results   Component Value Date    RDW 14.3 11/13/2016     Lab Results   Component Value Date     11/13/2016     No results found for: CHOL  No results found for: HDL  No results found for: LDL  No results found for: TRIG  No results found for: CHOLHDLRATIO    Prescription approved per [facility] [place]ill Protocol.    Stephy Min RN

## 2017-11-13 ENCOUNTER — CARE COORDINATION (OUTPATIENT)
Dept: GERIATRIC MEDICINE | Facility: CLINIC | Age: 82
End: 2017-11-13

## 2017-11-13 NOTE — PROGRESS NOTES
Called and spoke with Robyn SHAH and group home owner.  She is aware that the complex care clinic is closing and client already has been transitioned to  Hospice for primary care. She states client's daughter is aware of this. She states she is checking with [facility] to see if they can provide primary care onsite for client.  She believes that client should have primary care in addition to hospice due to their differences in how they treat medical conditions.  She states client recently had a UTI and hospice was hesitant to treat until she discussed the treatment with them, telling them her hospice diagnosis was cardiac related.  Told her that she should let CM know if primary care is established with [facility] as CM would then transfer client to the new care system.SANTHOSH Oneal, [facility]   Tel [phone]  Fax [phone]

## 2017-11-22 DIAGNOSIS — M19.90 ARTHRITIS: Primary | ICD-10-CM

## 2017-11-22 RX ORDER — ACETAMINOPHEN 325 MG/1
TABLET ORAL
Qty: 100 TABLET | Refills: 0 | Status: SHIPPED | OUTPATIENT
Start: 2017-11-22 | End: 2017-12-15

## 2017-11-22 NOTE — TELEPHONE ENCOUNTER
Last Written Prescription Date: 7/12/17  Last Fill Quantity: 100,  # refills: 3   [place] [facility] or [facility] prescribing provider: 10/31/17    Refilled per protocol.    Shireen Pearson RN

## 2017-12-15 DIAGNOSIS — M19.90 ARTHRITIS: ICD-10-CM

## 2017-12-18 RX ORDER — ACETAMINOPHEN 325 MG/1
TABLET ORAL
Qty: 100 TABLET | Refills: 0 | Status: SHIPPED | OUTPATIENT
Start: 2017-12-18

## 2017-12-18 NOTE — TELEPHONE ENCOUNTER
Prescription approved per [facility] Refill Protocol.    Nela Houser, CRISN RN

## 2018-03-26 ENCOUNTER — PATIENT OUTREACH (OUTPATIENT)
Dept: GERIATRIC MEDICINE | Facility: CLINIC | Age: 83
End: 2018-03-26

## 2018-03-26 NOTE — PROGRESS NOTES
Spoke with Robyn Vargas, , by phone.  She indicates that client has new PCP.  She continues to have [facility] but they no longer are primary.  New PCP is Dr. Garnica with [facility].  Informed her that CM would transfer back to [facility] for them to assign a new .  Disenrollment check list completed-UTF, updated POC, and informed CMS.  SANTHOSH Oneal, [facility]   Tel [phone]  Fax [phone]

## 2018-06-23 ENCOUNTER — APPOINTMENT (OUTPATIENT)
Dept: GENERAL RADIOLOGY | Facility: CLINIC | Age: 83
End: 2018-06-23
Attending: EMERGENCY MEDICINE
Payer: COMMERCIAL

## 2018-06-23 ENCOUNTER — HOSPITAL ENCOUNTER (EMERGENCY)
Facility: CLINIC | Age: 83
Discharge: HOME | End: 2018-06-23
Attending: EMERGENCY MEDICINE | Admitting: EMERGENCY MEDICINE
Payer: COMMERCIAL

## 2018-06-23 VITALS
RESPIRATION RATE: 18 BRPM | BODY MASS INDEX: 30.67 KG/M2 | TEMPERATURE: 97.8 F | WEIGHT: 190 LBS | OXYGEN SATURATION: 99 % | SYSTOLIC BLOOD PRESSURE: 165 MMHG | HEART RATE: 87 BPM | DIASTOLIC BLOOD PRESSURE: 81 MMHG

## 2018-06-23 DIAGNOSIS — S61.211A LACERATION OF LEFT INDEX FINGER WITHOUT FOREIGN BODY WITHOUT DAMAGE TO NAIL, INITIAL ENCOUNTER: ICD-10-CM

## 2018-06-23 DIAGNOSIS — S62.661A CLOSED NONDISPLACED FRACTURE OF DISTAL PHALANX OF LEFT INDEX FINGER, INITIAL ENCOUNTER: ICD-10-CM

## 2018-06-23 PROCEDURE — 29130 APPL FINGER SPLINT STATIC: CPT | Mod: F1

## 2018-06-23 PROCEDURE — 12002 RPR S/N/AX/GEN/TRNK2.6-7.5CM: CPT

## 2018-06-23 PROCEDURE — 99284 EMERGENCY DEPT VISIT MOD MDM: CPT

## 2018-06-23 PROCEDURE — 73130 X-RAY EXAM OF HAND: CPT | Mod: LT

## 2018-06-23 RX ORDER — BUPIVACAINE HYDROCHLORIDE 5 MG/ML
INJECTION, SOLUTION PERINEURAL
Status: DISCONTINUED
Start: 2018-06-23 | End: 2018-06-23 | Stop reason: HOSPADM

## 2018-06-23 RX ORDER — GINSENG 100 MG
CAPSULE ORAL
Status: DISCONTINUED
Start: 2018-06-23 | End: 2018-06-23 | Stop reason: HOSPADM

## 2018-06-23 ASSESSMENT — ENCOUNTER SYMPTOMS: WOUND: 1

## 2018-06-23 NOTE — ED PROVIDER NOTES
History     Chief Complaint:  Fall and Laceration    HPI   Felicia Soriano is a 89 year old female with a history of dementia who presents to the ED via EMS for evaluation after a fall and finger laceration. EMS reports that the patient was at her group home tonight, when the staff found her down in the hallway with a laceration to her left 2nd and 4th digits. Her fall was not observed, so they are unsure if she hit her head. EMS was called to bring her in for evaluation. She denies any other concerns here today.    Allergies:  Adhesive tape    Medications:    Albuterol  Aspirin  Colace  Lasix  Mapap  Melatonin  Pyridium  Miralax  Potassium chloride  Seroquel  Senexon  Tramadol    Past Medical History:    Aortic valve disorder  CHF  CKD  Chronic pain  Depression  Encephalopathy  Vitamin D deficiency  Breast cancer  Dementia  GERD  Renal sclerosis  HTN  Severe aortic stenosis  Syncope    Past Surgical History:    Right Mastectomy    Family History:    Father: parkinson's disease  Mother: alzheimer's disease    Social History:  Marital Status:   [5]  Negative for tobacco use.  Negative for alcohol use.    Review of Systems   Unable to perform ROS: Dementia   Skin: Positive for wound.     Physical Exam     Patient Vitals for the past 24 hrs:   BP Temp Temp src Pulse Resp SpO2 Weight   06/23/18 0136 (!) 164/100 97.8  F (36.6  C) Oral 92 18 97 % 86.2 kg (190 lb)     Physical Exam  Constitutional: Alert, attentive, GCS 15, normal mental status per family (confused to recent events)  HENT:     Nose: Nose normal.   Mouth/Throat: Oropharynx is clear, mucous membranes are moist   Ears: Normal external ears. TMs clear bilaterally, normal external canals    bilaterally.  Eyes: EOM are normal.    CV: Regular rate and rhythm, no murmurs, rubs or gallups.  Chest: Effort normal and breath sounds normal.   GI: No distension. There is no tenderness.  MSK: Normal range of motion.    Skin tear to the left 4th digit middle  phalanx dorsal aspect   Laceration to the left 2nd digit, with soft tissue swelling and contusion to the distal phalanx 2nd digit   C-spine cleared by NEXUS   No tenderness or stepoffs to the C/T/L-spine   Normal, atraumatic inspection to the back  Neurological: Alert, attentive  Skin: Skin is warm and dry.   Superficial laceration to the distal phalanx, left finger 2nd digit, 2.8 cm      Emergency Department Course   Imaging:  Radiographic findings were communicated with the patient who voiced understanding of the findings.  XR Hand 3 views, left:   Bandage material overlying the second and fourth fingers. Minimally displaced fracture involving the tuft of the distal phalanx of the index finger. No other acute fractures. Degenerative changes involving multiple IP joints. Osteopenia, as per radiology.     Procedures:    Narrative: Procedure: Laceration Repair        LACERATION:  A simple clean 2.8 cm laceration.      LOCATION:  Left Index Finger      FUNCTION:  Distally sensation, circulation, motor and tendon function are intact.      ANESTHESIA:  Local using 0.5% bupivicaine total of 3 mLs      PREPARATION:  Irrigation and Scrubbing with Normal Saline and Shur Clens      DEBRIDEMENT:  no debridement      CLOSURE:  Wound was closed with One Layer.  Skin closed with 5 x 5.0 Ethylon using interrupted sutures.    Emergency Department Course:  Nursing notes and vitals reviewed. (0142) I performed an exam of the patient as documented above.     The patient was sent for a Hand XR while in the emergency department, findings above.     (0402) I rechecked the patient and discussed the results of her workup thus far. I performed a laceration repair, as noted above. There were no complications of the procedure.    Findings and plan explained to the Patient and daughter. Patient discharged home with instructions regarding supportive care, medications, and reasons to return. The importance of close follow-up was reviewed.    I  personally reviewed the laboratory results with the Patient and daughter and answered all related questions prior to discharge.     Impression & Plan    Medical Decision Making:  Felicia Soriano is a 89 year old female who presents for evaluation of an unwitnessed fall with left hand injuries.  Head to toe exam reveals no evidence of closed head injury, spine or back injury, abdominal injury, or musculoskeletal injury aside from index and ring fingers.  Imaging is described as per above.  Laceration to the left index finger was fairly well approximated, limited only by soft tissue swelling.  Sutures will need to be removed in 7-10 days.  The index finger was splinted in an AlumaFoam splint.  I discussed the extensively with the family that unwitnessed close head injury is possible.  They wish to forego CT at this time but will return immediately should some of the red flag symptoms we discussed present.  She should follow-up with primary care in 2-3 days for recheck and for suture removal as per above.  She should return immediately for confusion, vomiting, or any other concerns.    Diagnosis:    ICD-10-CM   1. Closed nondisplaced fracture of distal phalanx of left index finger, initial encounter S62.661A   2. Laceration of left index finger without foreign body without damage to nail, initial encounter S61.211A     Disposition:  discharged to home with her daughter    Scribe Disclosure:  I, Shahrzad Reyes, am serving as a scribe on 6/23/2018 at 1:29 AM to personally document services performed by Ulysses Rogel MD based on my observations and the provider's statements to me.     Shahrzad Reyes  6/23/2018   [facility] EMERGENCY DEPARTMENT       Ulysses Rogel MD  06/24/18 0039

## 2018-06-23 NOTE — ED NOTES
Bed: ED16  Expected date: 6/23/18  Expected time:   Means of arrival: Ambulance  Comments:  Luisa Rosales

## 2018-06-23 NOTE — DISCHARGE INSTRUCTIONS
Special instructions  1. Maintain alumifoam splint until seen by orthopedics and cleared for removal.  2. Suture removal in 7-10 days.  Discharge Instructions  Laceration (Cut)    You were seen today for a laceration (cut).  Your provider examined your laceration for any problems such a buried foreign body (like glass, a splinter, or gravel), or injury to blood vessels, tendons, and nerves.  Your provider may have also rinsed and/or scrubbed your laceration to help prevent an infection. It may not be possible to find all problems with your laceration on the first visit; occasionally foreign bodies or a tendon injury can go undetected.    Your laceration may have been closed in one of several ways:    No closure: many wounds will heal just fine without closure.    Stitches: regular stitches that require removal.    Staples: skin staples are often used in the scalp/head.    Wound adhesive (glue): skin glue can be used for certain lacerations and doesn t require removal.    Wound strips (aka Butterfly bandages or steri-strips): these are bandages that help to close a wound.    Absorbable stitches:  dissolving  stitches that go away on their own and usually don t require removal.    A small percentage of wounds will develop an infection regardless of how well the wound is cared for. Antibiotics are generally not indicated to prevent an infection so are only given for a small number of high-risk wounds. Some lacerations are too high risk to close, and are left open to heal because closure can increase the likelihood that an infection will develop.    Remember that all lacerations, no matter how expertly repaired, will cause scarring. We consider many factors, techniques, and materials, in our efforts to provide the best possible cosmetic outcome.    Generally, every Emergency Department visit should have a follow-up clinic visit with either a primary or a specialty clinic/provider. Please follow-up as instructed by your  emergency provider today.     Return to the Emergency Department right away if:    You have more redness, swelling, pain, drainage (pus), a bad smell, or red streaking from your laceration as these symptoms could indicate an infection.    You have a fever of 100.4 F or more.    You have bleeding that you cannot stop at home. If your cut starts to bleed, hold pressure on the bleeding area with a clean cloth or put pressure over the bandage.  If the bleeding does not stop after using constant pressure for 30 minutes, you should return to the Emergency Department for further treatment.    An area past the laceration is cool, pale, or blue compared with the other side, or has a slower return of color when squeezed.    Your dressing seems too tight or starts to get uncomfortable or painful. For children, signs of a problem might be irritability or restlessness.    You have loss of normal function or use of an area, such as being unable to straighten or bend a finger normally.    You have a numb area past the laceration.    Return to the Emergency Department or see your regular provider if:    The laceration starts to come open.     You have something coming out of the cut or a feeling that there is something in the laceration.    Your wound will not heal, or keeps breaking open. There can always be glass, wood, dirt or other things in any wound.  They will not always show up, even on x-rays.  If a wound does not heal, this may be why, and it is important to follow-up with your regular provider.    Home Care:    Take your dressing off in 12-24 hours, or as instructed by your provider, to check your laceration. Remove the dressing sooner if it seems too tight or painful, or if it is getting numb, tingly, or pale past the dressing.    Gently wash your laceration 1-2 times daily with clean water and mild soap. It is okay to shower or run clean water over the laceration, but do not let the laceration soak in water (no  swimming).    If your laceration was closed with wound adhesive or strips: pat it dry and leave it open to the air. For all other repairs: after you wash your laceration, or at least 2 times a day, apply antibiotic ointment (such as Neosporin  or Bacitracin ) to the laceration, then cover it with a Band-Aid  or gauze.    Keep the laceration clean. Wear gloves or other protective clothing if you are around dirt.    Follow-up for removal:    Follow up with primary care for suture removal in 7-10 days      Scars:  To help minimize scarring:    Wear sunscreen over the healed laceration when out in the sun.    Massage the area regularly once healed.    You may apply Vitamin E to the healed wound.    Wait. Scars improve in appearance over months and years.    If you were given a prescription for medicine here today, be sure to read all of the information (including the package insert) that comes with your prescription.  This will include important information about the medicine, its side effects, and any warnings that you need to know about.  The pharmacist who fills the prescription can provide more information and answer questions you may have about the medicine.  If you have questions or concerns that the pharmacist cannot address, please call or return to the Emergency Department.       Remember that you can always come back to the Emergency Department if you are not able to see your regular provider in the amount of time listed above, if you get any new symptoms, or if there is anything that worries you.      Discharge Instructions  Extremity Injury    You were seen today for an injury to an extremity (arm, hand, leg, or foot). You may have a bruise, strain, or fracture (broken bone).    Generally, every Emergency Department visit should have a follow-up clinic visit with either a primary or a specialty clinic/provider. Please follow-up as instructed by your emergency provider today.  Return to the Emergency  Department right away if:    Your pain seems to change or get worse or there is pain in a new area that wasn t evaluated today.    Your extremity becomes pale, cool, blue, or numb or tingling past the injury.    You have more drainage, redness or pain in the area of the cut or abrasion.    You have pain that you cannot control with the medicine recommended or prescribed here, or you have pain that seems too much for your injury.    Your child (who is injured) will not stop crying or is much more fussy than normal.    You have new symptoms or anything that worries you.    What to Expect:    Your swelling and pain may be worse the day after your injury, but should not be severe and should start getting better after that. You should not have new symptoms and your pain should not get worse.    You may start to get a bruise over the injured area or below the injured area (bruising can follow gravity).    Your movement and strength should get better with time.    Some injuries may not show up until after you have left the Emergency Department so it is important to follow-up as directed.    Your injury may prevent you from working.  Follow-up with your regular provider to get a work release note.    Pain medications or your injury may make it unsafe to drive or operate machinery.    Home Care:    RICE: Rest, Ice, Compression, Elevation  o Rest: Rest your injured area for at least 1-2 days. After that you may start using your extremity again as long as there is not too much pain.   o Ice: Apply ice your injured area for 15 minutes at a time, at least 3 times a day. Use a cloth between the ice bag and your skin to prevent frostbite. Do not sleep with an ice pack or heating pad on, since this can cause burns or skin injury.  o Compression: You may use an elastic bandage (Ace  Wrap) if it makes you more comfortable. Wrap it just tight enough to provide light compression, like a new pair of socks feels. Loosen the bandage if  you have swelling past the bandage.  o Elevation: Raise the injured area above the level of your heart as much as possible in the first 1-2 days.      Use Tylenol  (acetaminophen), Motrin (ibuprofen), or Advil  (ibuprofen) for your pain unless you have an allergy or are told not to use these medications by your provider.  Take the medications as instructed on the package. Tylenol  (acetaminophen) is in many prescription medicines and non-prescription medicines--check all of your medicines to be sure you aren t taking more than 3000 mg per day.    Please follow any other instructions that were discussed with you by your provider.    Stretching/Exercises:  You may have been provided with instructions for stretching or exercises. If your injury was to your arm or shoulder and your provider put you in a sling or an immobilizer, it is important that you take off your immobilizer within 3 days and stretch/move your shoulder, unless your provider specifically tells you to not move your shoulder.  This is to prevent further injury such as a  frozen shoulder .     If you were given a prescription for medicine here today, be sure to read all of the information (including the package insert) that comes with your prescription.  This will include important information about the medicine, its side effects, and any warnings that you need to know about.  The pharmacist who fills the prescription can provide more information and answer questions you may have about the medicine.  If you have questions or concerns that the pharmacist cannot address, please call or return to the Emergency Department.     Remember that you can always come back to the Emergency Department if you are not able to see your regular provider in the amount of time listed above, if you get any new symptoms, or if there is anything that worries you.

## 2018-06-23 NOTE — ED AVS SNAPSHOT
[facility]    [address]    [facility] [address]    Phone:  [phone]    Fax:  [phone]                                       Felicia Soriano   MRN: [id number]    Department:  [facility]   Date of Visit:  6/23/2018           After Visit Summary Signature Page     I have received my discharge instructions, and my questions have been answered. I have discussed any challenges I see with this plan with the nurse or doctor.    ..........................................................................................................................................  Patient/Patient Representative Signature      ..........................................................................................................................................  Patient Representative Print Name and Relationship to Patient    ..................................................               ................................................  Date                                            Time    ..........................................................................................................................................  Reviewed by Signature/Title    ...................................................              ..............................................  Date                                                            Time

## 2018-06-23 NOTE — ED AVS SNAPSHOT
[facility]    [address]    [facility] [address]    Phone:  [phone]    Fax:  [phone]                                       Felicia Soriano   MRN: [id number]    Department:  [facility]   Date of Visit:  6/23/2018           Patient Information     Date Of Birth          9/23/1926        Your diagnoses for this visit were:     Closed nondisplaced fracture of distal phalanx of left index finger, initial encounter     Laceration of left index finger without foreign body without damage to nail, initial encounter        You were seen by Ulysses Rogel MD.      Follow-up Information     Follow up with Brad Stahl MD.    Specialty:  Family Practice    Why:  in 7-10 days for suture removal    Contact information:    [address]  [facility] [address] [phone]          Discharge Instructions       Special instructions  1. Maintain alumifoam splint until seen by orthopedics and cleared for removal.  2. Suture removal in 7-10 days.  Discharge Instructions  Laceration (Cut)    You were seen today for a laceration (cut).  Your provider examined your laceration for any problems such a buried foreign body (like glass, a splinter, or gravel), or injury to blood vessels, tendons, and nerves.  Your provider may have also rinsed and/or scrubbed your laceration to help prevent an infection. It may not be possible to find all problems with your laceration on the first visit; occasionally foreign bodies or a tendon injury can go undetected.    Your laceration may have been closed in one of several ways:    No closure: many wounds will heal just fine without closure.    Stitches: regular stitches that require removal.    Staples: skin staples are often used in the scalp/head.    Wound adhesive (glue): skin glue can be used for certain lacerations and doesn t require removal.    Wound strips (aka Butterfly  bandages or steri-strips): these are bandages that help to close a wound.    Absorbable stitches:  dissolving  stitches that go away on their own and usually don t require removal.    A small percentage of wounds will develop an infection regardless of how well the wound is cared for. Antibiotics are generally not indicated to prevent an infection so are only given for a small number of high-risk wounds. Some lacerations are too high risk to close, and are left open to heal because closure can increase the likelihood that an infection will develop.    Remember that all lacerations, no matter how expertly repaired, will cause scarring. We consider many factors, techniques, and materials, in our efforts to provide the best possible cosmetic outcome.    Generally, every Emergency Department visit should have a follow-up clinic visit with either a primary or a specialty clinic/provider. Please follow-up as instructed by your emergency provider today.     Return to the Emergency Department right away if:    You have more redness, swelling, pain, drainage (pus), a bad smell, or red streaking from your laceration as these symptoms could indicate an infection.    You have a fever of 100.4 F or more.    You have bleeding that you cannot stop at home. If your cut starts to bleed, hold pressure on the bleeding area with a clean cloth or put pressure over the bandage.  If the bleeding does not stop after using constant pressure for 30 minutes, you should return to the Emergency Department for further treatment.    An area past the laceration is cool, pale, or blue compared with the other side, or has a slower return of color when squeezed.    Your dressing seems too tight or starts to get uncomfortable or painful. For children, signs of a problem might be irritability or restlessness.    You have loss of normal function or use of an area, such as being unable to straighten or bend a finger normally.    You have a numb area past  the laceration.    Return to the Emergency Department or see your regular provider if:    The laceration starts to come open.     You have something coming out of the cut or a feeling that there is something in the laceration.    Your wound will not heal, or keeps breaking open. There can always be glass, wood, dirt or other things in any wound.  They will not always show up, even on x-rays.  If a wound does not heal, this may be why, and it is important to follow-up with your regular provider.    Home Care:    Take your dressing off in 12-24 hours, or as instructed by your provider, to check your laceration. Remove the dressing sooner if it seems too tight or painful, or if it is getting numb, tingly, or pale past the dressing.    Gently wash your laceration 1-2 times daily with clean water and mild soap. It is okay to shower or run clean water over the laceration, but do not let the laceration soak in water (no swimming).    If your laceration was closed with wound adhesive or strips: pat it dry and leave it open to the air. For all other repairs: after you wash your laceration, or at least 2 times a day, apply antibiotic ointment (such as Neosporin  or Bacitracin ) to the laceration, then cover it with a Band-Aid  or gauze.    Keep the laceration clean. Wear gloves or other protective clothing if you are around dirt.    Follow-up for removal:    Follow up with primary care for suture removal in 7-10 days      Scars:  To help minimize scarring:    Wear sunscreen over the healed laceration when out in the sun.    Massage the area regularly once healed.    You may apply Vitamin E to the healed wound.    Wait. Scars improve in appearance over months and years.    If you were given a prescription for medicine here today, be sure to read all of the information (including the package insert) that comes with your prescription.  This will include important information about the medicine, its side effects, and any warnings  that you need to know about.  The pharmacist who fills the prescription can provide more information and answer questions you may have about the medicine.  If you have questions or concerns that the pharmacist cannot address, please call or return to the Emergency Department.       Remember that you can always come back to the Emergency Department if you are not able to see your regular provider in the amount of time listed above, if you get any new symptoms, or if there is anything that worries you.      Discharge Instructions  Extremity Injury    You were seen today for an injury to an extremity (arm, hand, leg, or foot). You may have a bruise, strain, or fracture (broken bone).    Generally, every Emergency Department visit should have a follow-up clinic visit with either a primary or a specialty clinic/provider. Please follow-up as instructed by your emergency provider today.  Return to the Emergency Department right away if:    Your pain seems to change or get worse or there is pain in a new area that wasn t evaluated today.    Your extremity becomes pale, cool, blue, or numb or tingling past the injury.    You have more drainage, redness or pain in the area of the cut or abrasion.    You have pain that you cannot control with the medicine recommended or prescribed here, or you have pain that seems too much for your injury.    Your child (who is injured) will not stop crying or is much more fussy than normal.    You have new symptoms or anything that worries you.    What to Expect:    Your swelling and pain may be worse the day after your injury, but should not be severe and should start getting better after that. You should not have new symptoms and your pain should not get worse.    You may start to get a bruise over the injured area or below the injured area (bruising can follow gravity).    Your movement and strength should get better with time.    Some injuries may not show up until after you have left the  Emergency Department so it is important to follow-up as directed.    Your injury may prevent you from working.  Follow-up with your regular provider to get a work release note.    Pain medications or your injury may make it unsafe to drive or operate machinery.    Home Care:    RICE: Rest, Ice, Compression, Elevation  o Rest: Rest your injured area for at least 1-2 days. After that you may start using your extremity again as long as there is not too much pain.   o Ice: Apply ice your injured area for 15 minutes at a time, at least 3 times a day. Use a cloth between the ice bag and your skin to prevent frostbite. Do not sleep with an ice pack or heating pad on, since this can cause burns or skin injury.  o Compression: You may use an elastic bandage (Ace  Wrap) if it makes you more comfortable. Wrap it just tight enough to provide light compression, like a new pair of socks feels. Loosen the bandage if you have swelling past the bandage.  o Elevation: Raise the injured area above the level of your heart as much as possible in the first 1-2 days.      Use Tylenol  (acetaminophen), Motrin (ibuprofen), or Advil  (ibuprofen) for your pain unless you have an allergy or are told not to use these medications by your provider.  Take the medications as instructed on the package. Tylenol  (acetaminophen) is in many prescription medicines and non-prescription medicines--check all of your medicines to be sure you aren t taking more than 3000 mg per day.    Please follow any other instructions that were discussed with you by your provider.    Stretching/Exercises:  You may have been provided with instructions for stretching or exercises. If your injury was to your arm or shoulder and your provider put you in a sling or an immobilizer, it is important that you take off your immobilizer within 3 days and stretch/move your shoulder, unless your provider specifically tells you to not move your shoulder.  This is to prevent further  injury such as a  frozen shoulder .     If you were given a prescription for medicine here today, be sure to read all of the information (including the package insert) that comes with your prescription.  This will include important information about the medicine, its side effects, and any warnings that you need to know about.  The pharmacist who fills the prescription can provide more information and answer questions you may have about the medicine.  If you have questions or concerns that the pharmacist cannot address, please call or return to the Emergency Department.     Remember that you can always come back to the Emergency Department if you are not able to see your regular provider in the amount of time listed above, if you get any new symptoms, or if there is anything that worries you.        24 Hour Appointment Hotline       To make an appointment at any [facility], call [phone] ([phone]). If you don't have a family doctor or clinic, we will help you find one. [place] clinics are conveniently located to serve the needs of you and your family.             Review of your medicines      Our records show that you are taking the medicines listed below. If these are incorrect, please call your family doctor or clinic.        Dose / Directions Last dose taken    albuterol 108 (90 Base) MCG/ACT Inhaler   Commonly known as:  PROAIR HFA/PROVENTIL HFA/VENTOLIN HFA   Dose:  2 puff   Quantity:  1 Inhaler        Inhale 2 puffs into the lungs every 6 hours as needed for shortness of breath / dyspnea or wheezing   Refills:  0        ALL DAY ALLERGY 10 MG tablet   Quantity:  30 tablet   Generic drug:  cetirizine        TAKE 10MG (1 TABLET) BY MOUTH EVERY DAY AS NEEDED FOR ALLERGIES   Refills:  0        aspirin 81 MG EC tablet   Dose:  81 mg   Quantity:  30 tablet        Take 1 tablet (81 mg) by mouth daily   Refills:  11        docusate sodium 100 MG capsule   Commonly known as:  COLACE   Dose:  100 mg    Quantity:  60 capsule        Take 1 capsule (100 mg) by mouth daily   Refills:  3        furosemide 20 MG tablet   Commonly known as:  LASIX   Quantity:  90 tablet        TAKE 20MG (1 TABLET) BY MOUTH EVERY DAY   Refills:  3        MAPAP 325 MG tablet   Quantity:  100 tablet   Generic drug:  acetaminophen        TAKE 650MG (2 TABLETS) BY MOUTH 2 TIMES A DAY.   Refills:  0        order for DME   Quantity:  1 Device        Equipment being ordered: blood pressure kit , cuff size large   Refills:  0        * order for DME   Quantity:  1 Device        Equipment being ordered: Fully Electronic Hospital Bed   Refills:  0        * order for DME   Quantity:  1 Device        Equipment being ordered: chair lift   Refills:  0        * order for DME   Quantity:  1 Device        Equipment being ordered: chair lift  For severe arthritis of left knee   Refills:  0        * order for DME   Quantity:  1 Device        Walker with wheels. [address]   Refills:  0        polyethylene glycol powder   Commonly known as:  MIRALAX/GLYCOLAX   Dose:  17 g        Take 17 g by mouth daily as needed for constipation   Refills:  0        potassium chloride 10 MEQ tablet   Commonly known as:  K-TAB,KLOR-CON   Quantity:  30 tablet        TAKE 10MEQ (1 TABLET) BY MOUTH EVERY DAY.   Refills:  0        potassium chloride SA 10 MEQ CR tablet   Commonly known as:  K-DUR/KLOR-CON M   Quantity:  30 tablet        TAKE 10MEQ (1 TABLET) BY MOUTH EVERY DAY   Refills:  11        QUEtiapine 25 MG tablet   Commonly known as:  SEROquel   Quantity:  30 tablet        TAKE 25MG (1 TABLET) BY MOUTH AT BEDTIME   Refills:  11        SENEXON-S 8.6-50 MG per tablet   Quantity:  60 tablet   Generic drug:  senna-docusate        TAKE ONE TABLET BY MOUTH 2 TIMES A DAY AS NEEDED FOR CONSTIPATION   Refills:  0        traMADol 50 MG tablet   Commonly known as:  ULTRAM   Dose:  50 mg   Quantity:  10 tablet        Take 1 tablet (50 mg) by mouth every 6 hours as needed for  moderate pain   Refills:  0        Vitamin D (Cholecalciferol) 1000 units Tabs   Dose:  2000 Units   Quantity:  180 tablet        Take 2,000 Units by mouth daily   Refills:  3        ZOLOFT PO   Dose:  50 mg        Take 50 mg by mouth daily   Refills:  0        * Notice:  This list has 4 medication(s) that are the same as other medications prescribed for you. Read the directions carefully, and ask your doctor or other care provider to review them with you.            Procedures and tests performed during your visit     XR Hand Left G/E 3 Views      Orders Needing Specimen Collection     None      Pending Results     No orders found from 6/21/2018 to 6/24/2018.            Pending Culture Results     No orders found from 6/21/2018 to 6/24/2018.            Pending Results Instructions     If you had any lab results that were not finalized at the time of your Discharge, you can call the ED Lab Result RN at [phone]. You will be contacted by this team for any positive Lab results or changes in treatment. The nurses are available 7 days a week from 10A to 6:30P.  You can leave a message 24 hours per day and they will return your call.        Test Results From Your Hospital Stay              6/23/2018  2:26 AM      Narrative     XR HAND LT G/E 3 VW  6/23/2018 2:09 AM     INDICATION: Pain, crush injury to second and fourth digits.    COMPARISON: None.        Impression     IMPRESSION: Bandage material overlying the second and fourth fingers.  Minimally displaced fracture involving the tuft of the distal phalanx  of the index finger. No other acute fractures. Degenerative changes  involving multiple IP joints. Osteopenia.    KLARISSA BARRIOS MD                Clinical Quality Measure: Blood Pressure Screening     Your blood pressure was checked while you were in the emergency department today. The last reading we obtained was  BP: 165/81 . Please read the guidelines below about what these numbers mean and what you  should do about them.  If your systolic blood pressure (the top number) is less than 120 and your diastolic blood pressure (the bottom number) is less than 80, then your blood pressure is normal. There is nothing more that you need to do about it.  If your systolic blood pressure (the top number) is 120-139 or your diastolic blood pressure (the bottom number) is 80-89, your blood pressure may be higher than it should be. You should have your blood pressure rechecked within a year by a primary care provider.  If your systolic blood pressure (the top number) is 140 or greater or your diastolic blood pressure (the bottom number) is 90 or greater, you may have high blood pressure. High blood pressure is treatable, but if left untreated over time it can put you at risk for heart attack, stroke, or kidney failure. You should have your blood pressure rechecked by a primary care provider within the next 4 weeks.  If your provider in the emergency department today gave you specific instructions to follow-up with your doctor or provider even sooner than that, you should follow that instruction and not wait for up to 4 weeks for your follow-up visit.        Thank you for choosing [facility]       Thank you for choosing [facility] for your care. Our goal is always to provide you with excellent care. Hearing back from our patients is one way we can continue to improve our services. Please take a few minutes to complete the written survey that you may receive in the mail after you visit with us. Thank you!        [employer] Information     "[employer] gives you secure access to your electronic health record. If you see a primary care provider, you can also send messages to your care team and make appointments. If you have questions, please call your primary care clinic.  If you do not have a primary care provider, please call [phone] and they will assist you.        Care EveryWhere ID     This is your Care EveryWhere ID. This could be  used by other organizations to access your [place] medical records  [id number]        Equal Access to Services     ARCELIA SUTTON : Mayi Argueta, eze landaverde, catie cabello. So [facility] [phone].    ATENCIÓN: Si habla español, tiene a cruz disposición servicios gratuitos de asistencia lingüística. Llame al [phone].    We comply with applicable federal civil rights laws and [place] laws. We do not discriminate on the basis of race, color, national origin, age, disability, sex, sexual orientation, or gender identity.            After Visit Summary       This is your record. Keep this with you and show to your community pharmacist(s) and doctor(s) at your next visit.

## 2020-02-06 ENCOUNTER — DOCUMENTATION ONLY (OUTPATIENT)
Facility: CLINIC | Age: 85
End: 2020-02-06

## 2020-02-09 NOTE — PROGRESS NOTES
Hospice physician visit  Location: Home  Reason for visit: Assess symptoms and determine prognosis  HPI: Patient is a 89-year-old woman with aortic stenosis and related Alzheimer's disease, dementia with behaviors, hypertensive heart and chronic kidney disease, left ventricular failure and diastolic heart failure.  I was asked to see the patient by her  due to concerns of unmanaged pain.  Patient lives in a group home, cared for by staff.  Family is been concerned about use of pain medication due to increased lethargy.  At current baseline she is sleeping 20 hours/day, up only for meals.  Her appetite is variable and she has had significant weight loss, decrease in arm circumference over the last certification period, spitting out food and refusing meals and increasing dysphasia with food.  She is using tramadol sparingly, 1 dose every few weeks when she has complaints of pain.  Group home staff report that she normally receives care at times attempting to bite or hit staff during transfers.  They often find they can reapproach and redirect her with success.  Staff at the group home that work with her during the day, during the week work very hard to find food and liquid that she finds palatable and will eat and drink.  PMH: GERD, osteoarthritis.  PSH: Patient is nonverbal and unable to report.  Family history: Unknown, patient unable to report.  Social history: Lives at the group home, family involved but visits infrequently, about once a month.  12 point review systems attempted but patient is largely nonverbal.    Objective  Sleeping in her hospital bed.  Awakens to light touch.  Able to answer yes/no questions with variable accuracy.  Drifts off to sleep when not being stimulated.  Eyes: Nonicteric.  Oropharynx: Moist.  Neck: Supple, no LAD, thyromegaly, mass.  Chest: Diminished breath sounds at the bases otherwise clear.  Heart: RRR, loud systolic murmur heard best at the right upper sternal border.   Abdomen: Soft, nondistended, nontender.  Extremities: No cyanosis/clubbing/edema.  Neurologic: Able to wiggle fingers and toes, very weak.  Not alert, unable to assess orientation.  No obvious facial droop.  Increased rigidity in her upper extremities.  Skin: Large birthmark on the center of the chest.  Assessment: Patient is a 89-year-old woman with severe aortic stenosis complicated by Alzheimer's dementia.  She does have agitation and combativeness despite scheduled and PRN Seroquel.  I believe that she does have pain meds motivating her behavior, family is reluctant to use narcotics due to increasing her sedation.  Plan  I will discuss patient's pain, plan for management with family if I can reach them.  I would recommend low-dose scheduled tramadol, possibly twice daily to see if this would help.  Her other symptoms appear well managed at this time.  Shahrzad Chang MD  Associate Medical Director  [facility]

## 2020-03-02 ENCOUNTER — HEALTH MAINTENANCE LETTER (OUTPATIENT)
Age: 85
End: 2020-03-02

## 2020-12-20 ENCOUNTER — HEALTH MAINTENANCE LETTER (OUTPATIENT)
Age: 85
End: 2020-12-20

## 2021-04-24 ENCOUNTER — HEALTH MAINTENANCE LETTER (OUTPATIENT)
Age: 86
End: 2021-04-24

## 2021-10-03 ENCOUNTER — HEALTH MAINTENANCE LETTER (OUTPATIENT)
Age: 86
End: 2021-10-03

## 2022-05-15 ENCOUNTER — HEALTH MAINTENANCE LETTER (OUTPATIENT)
Age: 87
End: 2022-05-15

## 2022-09-10 ENCOUNTER — HEALTH MAINTENANCE LETTER (OUTPATIENT)
Age: 87
End: 2022-09-10

## 2023-06-03 ENCOUNTER — HEALTH MAINTENANCE LETTER (OUTPATIENT)
Age: 88
End: 2023-06-03

## 2024-02-18 ENCOUNTER — HEALTH MAINTENANCE LETTER (OUTPATIENT)
Age: 89
End: 2024-02-18

## 2024-02-19 ENCOUNTER — TELEPHONE (OUTPATIENT)
Dept: CARE COORDINATION | Facility: CLINIC | Age: 89
End: 2024-02-19

## 2024-02-19 NOTE — TELEPHONE ENCOUNTER
Daughter calls to advise she is still getting Health reminders for her mother thru [facility] and that her Mother passed away on 05/25/21.  Reactivated [facility].